# Patient Record
Sex: MALE | Race: WHITE | NOT HISPANIC OR LATINO | URBAN - METROPOLITAN AREA
[De-identification: names, ages, dates, MRNs, and addresses within clinical notes are randomized per-mention and may not be internally consistent; named-entity substitution may affect disease eponyms.]

---

## 2022-01-17 ENCOUNTER — HOSPITAL ENCOUNTER (EMERGENCY)
Facility: CLINIC | Age: 27
Discharge: ANOTHER HOSPITAL | End: 2022-01-18
Attending: EMERGENCY MEDICINE | Admitting: EMERGENCY MEDICINE
Payer: COMMERCIAL

## 2022-01-17 DIAGNOSIS — R73.9 HYPERGLYCEMIA: ICD-10-CM

## 2022-01-17 DIAGNOSIS — E10.10 DIABETIC KETOACIDOSIS WITHOUT COMA ASSOCIATED WITH TYPE 1 DIABETES MELLITUS (H): ICD-10-CM

## 2022-01-17 LAB — GLUCOSE BLDC GLUCOMTR-MCNC: 443 MG/DL (ref 70–99)

## 2022-01-17 PROCEDURE — 99285 EMERGENCY DEPT VISIT HI MDM: CPT

## 2022-01-17 ASSESSMENT — MIFFLIN-ST. JEOR: SCORE: 1729.22

## 2022-01-18 ENCOUNTER — HOSPITAL ENCOUNTER (INPATIENT)
Facility: CLINIC | Age: 27
LOS: 2 days | Discharge: HOME | End: 2022-01-20
Attending: HOSPITALIST | Admitting: INTERNAL MEDICINE
Payer: COMMERCIAL

## 2022-01-18 VITALS
HEIGHT: 72 IN | WEIGHT: 159 LBS | HEART RATE: 59 BPM | DIASTOLIC BLOOD PRESSURE: 72 MMHG | RESPIRATION RATE: 16 BRPM | SYSTOLIC BLOOD PRESSURE: 125 MMHG | BODY MASS INDEX: 21.54 KG/M2 | TEMPERATURE: 97.7 F | OXYGEN SATURATION: 100 %

## 2022-01-18 DIAGNOSIS — E10.10 DIABETIC KETOACIDOSIS WITHOUT COMA ASSOCIATED WITH TYPE 1 DIABETES MELLITUS (H): Primary | ICD-10-CM

## 2022-01-18 PROBLEM — E11.10 DKA (DIABETIC KETOACIDOSIS) (H): Status: ACTIVE | Noted: 2022-01-18

## 2022-01-18 LAB
ALBUMIN SERPL-MCNC: 3.8 G/DL (ref 3.4–5)
ALBUMIN SERPL-MCNC: 5 G/DL (ref 3.5–5)
ALBUMIN UR-MCNC: 30 MG/DL
ALP SERPL-CCNC: 74 U/L (ref 40–150)
ALP SERPL-CCNC: 89 U/L (ref 45–120)
ALT SERPL W P-5'-P-CCNC: 26 U/L (ref 0–45)
ALT SERPL W P-5'-P-CCNC: 27 U/L (ref 0–70)
ANION GAP SERPL CALCULATED.3IONS-SCNC: 10 MMOL/L (ref 3–14)
ANION GAP SERPL CALCULATED.3IONS-SCNC: 11 MMOL/L (ref 3–14)
ANION GAP SERPL CALCULATED.3IONS-SCNC: 22 MMOL/L (ref 5–18)
ANION GAP SERPL CALCULATED.3IONS-SCNC: 9 MMOL/L (ref 3–14)
APPEARANCE UR: CLEAR
AST SERPL W P-5'-P-CCNC: 10 U/L (ref 0–45)
AST SERPL W P-5'-P-CCNC: 17 U/L (ref 0–40)
ATRIAL RATE - MUSE: 68 BPM
BASE EXCESS BLDV CALC-SCNC: -12.9 MMOL/L
BASOPHILS # BLD AUTO: 0.1 10E3/UL (ref 0–0.2)
BASOPHILS NFR BLD AUTO: 1 %
BILIRUB SERPL-MCNC: 0.5 MG/DL (ref 0.2–1.3)
BILIRUB SERPL-MCNC: 0.6 MG/DL (ref 0–1)
BILIRUB UR QL STRIP: NEGATIVE
BUN SERPL-MCNC: 15 MG/DL (ref 7–30)
BUN SERPL-MCNC: 16 MG/DL (ref 7–30)
BUN SERPL-MCNC: 17 MG/DL (ref 8–22)
BUN SERPL-MCNC: 18 MG/DL (ref 7–30)
CALCIUM SERPL-MCNC: 10.7 MG/DL (ref 8.5–10.5)
CALCIUM SERPL-MCNC: 8.5 MG/DL (ref 8.5–10.1)
CALCIUM SERPL-MCNC: 8.5 MG/DL (ref 8.5–10.1)
CALCIUM SERPL-MCNC: 8.8 MG/DL (ref 8.5–10.1)
CHLORIDE BLD-SCNC: 100 MMOL/L (ref 98–107)
CHLORIDE BLD-SCNC: 109 MMOL/L (ref 94–109)
CHLORIDE BLD-SCNC: 109 MMOL/L (ref 94–109)
CHLORIDE BLD-SCNC: 112 MMOL/L (ref 94–109)
CO2 SERPL-SCNC: 11 MMOL/L (ref 22–31)
CO2 SERPL-SCNC: 17 MMOL/L (ref 20–32)
CO2 SERPL-SCNC: 19 MMOL/L (ref 20–32)
CO2 SERPL-SCNC: 21 MMOL/L (ref 20–32)
COLOR UR AUTO: COLORLESS
CREAT SERPL-MCNC: 0.7 MG/DL (ref 0.66–1.25)
CREAT SERPL-MCNC: 0.74 MG/DL (ref 0.66–1.25)
CREAT SERPL-MCNC: 0.75 MG/DL (ref 0.66–1.25)
CREAT SERPL-MCNC: 1.42 MG/DL (ref 0.7–1.3)
DIASTOLIC BLOOD PRESSURE - MUSE: NORMAL MMHG
EOSINOPHIL # BLD AUTO: 0.1 10E3/UL (ref 0–0.7)
EOSINOPHIL NFR BLD AUTO: 1 %
ERYTHROCYTE [DISTWIDTH] IN BLOOD BY AUTOMATED COUNT: 12.4 % (ref 10–15)
GFR SERPL CREATININE-BSD FRML MDRD: 69 ML/MIN/1.73M2
GFR SERPL CREATININE-BSD FRML MDRD: >90 ML/MIN/1.73M2
GLUCOSE BLD-MCNC: 164 MG/DL (ref 70–99)
GLUCOSE BLD-MCNC: 187 MG/DL (ref 70–99)
GLUCOSE BLD-MCNC: 218 MG/DL (ref 70–99)
GLUCOSE BLD-MCNC: 492 MG/DL (ref 70–125)
GLUCOSE BLDC GLUCOMTR-MCNC: 140 MG/DL (ref 70–99)
GLUCOSE BLDC GLUCOMTR-MCNC: 140 MG/DL (ref 70–99)
GLUCOSE BLDC GLUCOMTR-MCNC: 141 MG/DL (ref 70–99)
GLUCOSE BLDC GLUCOMTR-MCNC: 152 MG/DL (ref 70–99)
GLUCOSE BLDC GLUCOMTR-MCNC: 156 MG/DL (ref 70–99)
GLUCOSE BLDC GLUCOMTR-MCNC: 171 MG/DL (ref 70–99)
GLUCOSE BLDC GLUCOMTR-MCNC: 174 MG/DL (ref 70–99)
GLUCOSE BLDC GLUCOMTR-MCNC: 178 MG/DL (ref 70–99)
GLUCOSE BLDC GLUCOMTR-MCNC: 180 MG/DL (ref 70–99)
GLUCOSE BLDC GLUCOMTR-MCNC: 186 MG/DL (ref 70–99)
GLUCOSE BLDC GLUCOMTR-MCNC: 189 MG/DL (ref 70–99)
GLUCOSE BLDC GLUCOMTR-MCNC: 200 MG/DL (ref 70–99)
GLUCOSE BLDC GLUCOMTR-MCNC: 200 MG/DL (ref 70–99)
GLUCOSE BLDC GLUCOMTR-MCNC: 211 MG/DL (ref 70–99)
GLUCOSE BLDC GLUCOMTR-MCNC: 219 MG/DL (ref 70–99)
GLUCOSE BLDC GLUCOMTR-MCNC: 220 MG/DL (ref 70–99)
GLUCOSE BLDC GLUCOMTR-MCNC: 228 MG/DL (ref 70–99)
GLUCOSE BLDC GLUCOMTR-MCNC: 290 MG/DL (ref 70–99)
GLUCOSE BLDC GLUCOMTR-MCNC: 309 MG/DL (ref 70–99)
GLUCOSE UR STRIP-MCNC: >1000 MG/DL
HBA1C MFR BLD: 11.6 %
HCO3 BLDV-SCNC: 14 MMOL/L (ref 24–30)
HCT VFR BLD AUTO: 43.7 % (ref 40–53)
HGB BLD-MCNC: 15.5 G/DL (ref 13.3–17.7)
HGB UR QL STRIP: ABNORMAL
HOLD SPECIMEN: NORMAL
HYALINE CASTS: 3 /LPF
IMM GRANULOCYTES # BLD: 0.1 10E3/UL
IMM GRANULOCYTES NFR BLD: 1 %
INTERPRETATION ECG - MUSE: NORMAL
KETONES BLD-SCNC: 1.9 MMOL/L (ref 0–0.6)
KETONES BLD-SCNC: 2 MMOL/L (ref 0–0.6)
KETONES BLD-SCNC: 2.7 MMOL/L (ref 0–0.6)
KETONES BLD-SCNC: 3.7 MMOL/L (ref 0–0.6)
KETONES BLD-SCNC: 8.52 MMOL/L
KETONES UR STRIP-MCNC: 150 MG/DL
LEUKOCYTE ESTERASE UR QL STRIP: NEGATIVE
LYMPHOCYTES # BLD AUTO: 3.1 10E3/UL (ref 0.8–5.3)
LYMPHOCYTES NFR BLD AUTO: 39 %
MAGNESIUM SERPL-MCNC: 2.4 MG/DL (ref 1.6–2.3)
MCH RBC QN AUTO: 31.7 PG (ref 26.5–33)
MCHC RBC AUTO-ENTMCNC: 35.5 G/DL (ref 31.5–36.5)
MCV RBC AUTO: 89 FL (ref 78–100)
MONOCYTES # BLD AUTO: 0.6 10E3/UL (ref 0–1.3)
MONOCYTES NFR BLD AUTO: 8 %
MUCOUS THREADS #/AREA URNS LPF: PRESENT /LPF
NEUTROPHILS # BLD AUTO: 3.9 10E3/UL (ref 1.6–8.3)
NEUTROPHILS NFR BLD AUTO: 50 %
NITRATE UR QL: NEGATIVE
NRBC # BLD AUTO: 0 10E3/UL
NRBC BLD AUTO-RTO: 0 /100
OXYHGB MFR BLDV: 51.3 % (ref 70–75)
P AXIS - MUSE: 61 DEGREES
PCO2 BLDV: 42 MM HG (ref 35–50)
PH BLDV: 7.17 [PH] (ref 7.35–7.45)
PH UR STRIP: 5.5 [PH] (ref 5–7)
PHOSPHATE SERPL-MCNC: 1.8 MG/DL (ref 2.5–4.5)
PLATELET # BLD AUTO: 236 10E3/UL (ref 150–450)
PO2 BLDV: 28 MM HG (ref 25–47)
POTASSIUM BLD-SCNC: 3.3 MMOL/L (ref 3.4–5.3)
POTASSIUM BLD-SCNC: 3.4 MMOL/L (ref 3.4–5.3)
POTASSIUM BLD-SCNC: 3.4 MMOL/L (ref 3.4–5.3)
POTASSIUM BLD-SCNC: 3.6 MMOL/L (ref 3.4–5.3)
POTASSIUM BLD-SCNC: 3.6 MMOL/L (ref 3.4–5.3)
POTASSIUM BLD-SCNC: 3.8 MMOL/L (ref 3.5–5)
PR INTERVAL - MUSE: 150 MS
PROT SERPL-MCNC: 6.9 G/DL (ref 6.8–8.8)
PROT SERPL-MCNC: 8.3 G/DL (ref 6–8)
QRS DURATION - MUSE: 96 MS
QT - MUSE: 388 MS
QTC - MUSE: 412 MS
R AXIS - MUSE: 73 DEGREES
RBC # BLD AUTO: 4.89 10E6/UL (ref 4.4–5.9)
RBC URINE: 0 /HPF
SAO2 % BLDV: 52.3 % (ref 70–75)
SARS-COV-2 RNA RESP QL NAA+PROBE: NEGATIVE
SODIUM SERPL-SCNC: 133 MMOL/L (ref 136–145)
SODIUM SERPL-SCNC: 138 MMOL/L (ref 133–144)
SODIUM SERPL-SCNC: 139 MMOL/L (ref 133–144)
SODIUM SERPL-SCNC: 140 MMOL/L (ref 133–144)
SP GR UR STRIP: 1.04 (ref 1–1.03)
SYSTOLIC BLOOD PRESSURE - MUSE: NORMAL MMHG
T AXIS - MUSE: 42 DEGREES
TSH SERPL DL<=0.005 MIU/L-ACNC: 2.64 UIU/ML (ref 0.3–5)
UROBILINOGEN UR STRIP-MCNC: <2 MG/DL
VENTRICULAR RATE- MUSE: 68 BPM
WBC # BLD AUTO: 7.8 10E3/UL (ref 4–11)
WBC URINE: 0 /HPF

## 2022-01-18 PROCEDURE — 82010 KETONE BODYS QUAN: CPT | Performed by: HOSPITALIST

## 2022-01-18 PROCEDURE — 96365 THER/PROPH/DIAG IV INF INIT: CPT

## 2022-01-18 PROCEDURE — C9803 HOPD COVID-19 SPEC COLLECT: HCPCS

## 2022-01-18 PROCEDURE — 82374 ASSAY BLOOD CARBON DIOXIDE: CPT | Performed by: NURSE PRACTITIONER

## 2022-01-18 PROCEDURE — 83036 HEMOGLOBIN GLYCOSYLATED A1C: CPT | Performed by: EMERGENCY MEDICINE

## 2022-01-18 PROCEDURE — 96366 THER/PROPH/DIAG IV INF ADDON: CPT

## 2022-01-18 PROCEDURE — 258N000003 HC RX IP 258 OP 636: Performed by: EMERGENCY MEDICINE

## 2022-01-18 PROCEDURE — 250N000012 HC RX MED GY IP 250 OP 636 PS 637: Performed by: EMERGENCY MEDICINE

## 2022-01-18 PROCEDURE — 82310 ASSAY OF CALCIUM: CPT | Performed by: STUDENT IN AN ORGANIZED HEALTH CARE EDUCATION/TRAINING PROGRAM

## 2022-01-18 PROCEDURE — 84450 TRANSFERASE (AST) (SGOT): CPT | Performed by: HOSPITALIST

## 2022-01-18 PROCEDURE — 93005 ELECTROCARDIOGRAM TRACING: CPT

## 2022-01-18 PROCEDURE — 82784 ASSAY IGA/IGD/IGG/IGM EACH: CPT | Performed by: HOSPITALIST

## 2022-01-18 PROCEDURE — 93010 ELECTROCARDIOGRAM REPORT: CPT | Performed by: INTERNAL MEDICINE

## 2022-01-18 PROCEDURE — 99222 1ST HOSP IP/OBS MODERATE 55: CPT | Performed by: NURSE PRACTITIONER

## 2022-01-18 PROCEDURE — 82010 KETONE BODYS QUAN: CPT | Performed by: EMERGENCY MEDICINE

## 2022-01-18 PROCEDURE — 36415 COLL VENOUS BLD VENIPUNCTURE: CPT | Performed by: HOSPITALIST

## 2022-01-18 PROCEDURE — 82962 GLUCOSE BLOOD TEST: CPT

## 2022-01-18 PROCEDURE — 80053 COMPREHEN METABOLIC PANEL: CPT | Performed by: EMERGENCY MEDICINE

## 2022-01-18 PROCEDURE — 87040 BLOOD CULTURE FOR BACTERIA: CPT | Performed by: HOSPITALIST

## 2022-01-18 PROCEDURE — 36415 COLL VENOUS BLD VENIPUNCTURE: CPT | Performed by: EMERGENCY MEDICINE

## 2022-01-18 PROCEDURE — 99223 1ST HOSP IP/OBS HIGH 75: CPT | Performed by: HOSPITALIST

## 2022-01-18 PROCEDURE — 84132 ASSAY OF SERUM POTASSIUM: CPT | Performed by: INTERNAL MEDICINE

## 2022-01-18 PROCEDURE — 99223 1ST HOSP IP/OBS HIGH 75: CPT | Mod: AI | Performed by: INTERNAL MEDICINE

## 2022-01-18 PROCEDURE — 258N000001 HC RX 258: Performed by: EMERGENCY MEDICINE

## 2022-01-18 PROCEDURE — 250N000009 HC RX 250: Performed by: HOSPITALIST

## 2022-01-18 PROCEDURE — 96361 HYDRATE IV INFUSION ADD-ON: CPT

## 2022-01-18 PROCEDURE — 250N000013 HC RX MED GY IP 250 OP 250 PS 637: Performed by: INTERNAL MEDICINE

## 2022-01-18 PROCEDURE — 120N000002 HC R&B MED SURG/OB UMMC

## 2022-01-18 PROCEDURE — 86341 ISLET CELL ANTIBODY: CPT | Performed by: NURSE PRACTITIONER

## 2022-01-18 PROCEDURE — 999N000128 HC STATISTIC PERIPHERAL IV START W/O US GUIDANCE

## 2022-01-18 PROCEDURE — 250N000012 HC RX MED GY IP 250 OP 636 PS 637: Performed by: NURSE PRACTITIONER

## 2022-01-18 PROCEDURE — 85025 COMPLETE CBC W/AUTO DIFF WBC: CPT | Performed by: EMERGENCY MEDICINE

## 2022-01-18 PROCEDURE — 250N000011 HC RX IP 250 OP 636: Performed by: HOSPITALIST

## 2022-01-18 PROCEDURE — 36415 COLL VENOUS BLD VENIPUNCTURE: CPT | Performed by: STUDENT IN AN ORGANIZED HEALTH CARE EDUCATION/TRAINING PROGRAM

## 2022-01-18 PROCEDURE — 84100 ASSAY OF PHOSPHORUS: CPT | Performed by: HOSPITALIST

## 2022-01-18 PROCEDURE — 96372 THER/PROPH/DIAG INJ SC/IM: CPT | Performed by: HOSPITALIST

## 2022-01-18 PROCEDURE — 250N000009 HC RX 250: Performed by: NURSE PRACTITIONER

## 2022-01-18 PROCEDURE — 83516 IMMUNOASSAY NONANTIBODY: CPT | Performed by: HOSPITALIST

## 2022-01-18 PROCEDURE — 258N000003 HC RX IP 258 OP 636: Performed by: HOSPITALIST

## 2022-01-18 PROCEDURE — 250N000013 HC RX MED GY IP 250 OP 250 PS 637: Performed by: STUDENT IN AN ORGANIZED HEALTH CARE EDUCATION/TRAINING PROGRAM

## 2022-01-18 PROCEDURE — 83735 ASSAY OF MAGNESIUM: CPT | Performed by: HOSPITALIST

## 2022-01-18 PROCEDURE — 84443 ASSAY THYROID STIM HORMONE: CPT | Performed by: HOSPITALIST

## 2022-01-18 PROCEDURE — 87635 SARS-COV-2 COVID-19 AMP PRB: CPT | Performed by: EMERGENCY MEDICINE

## 2022-01-18 PROCEDURE — 82805 BLOOD GASES W/O2 SATURATION: CPT | Performed by: EMERGENCY MEDICINE

## 2022-01-18 PROCEDURE — 84155 ASSAY OF PROTEIN SERUM: CPT | Performed by: HOSPITALIST

## 2022-01-18 PROCEDURE — 96368 THER/DIAG CONCURRENT INF: CPT

## 2022-01-18 PROCEDURE — 36592 COLLECT BLOOD FROM PICC: CPT | Performed by: HOSPITALIST

## 2022-01-18 PROCEDURE — 84681 ASSAY OF C-PEPTIDE: CPT | Performed by: NURSE PRACTITIONER

## 2022-01-18 PROCEDURE — 81001 URINALYSIS AUTO W/SCOPE: CPT | Performed by: EMERGENCY MEDICINE

## 2022-01-18 PROCEDURE — 36415 COLL VENOUS BLD VENIPUNCTURE: CPT | Performed by: NURSE PRACTITIONER

## 2022-01-18 RX ORDER — MULTIPLE VITAMINS W/ MINERALS TAB 9MG-400MCG
1 TAB ORAL DAILY
COMMUNITY

## 2022-01-18 RX ORDER — DEXTROSE MONOHYDRATE 25 G/50ML
25-50 INJECTION, SOLUTION INTRAVENOUS
Status: DISCONTINUED | OUTPATIENT
Start: 2022-01-18 | End: 2022-01-20 | Stop reason: HOSPADM

## 2022-01-18 RX ORDER — NICOTINE POLACRILEX 4 MG
15-30 LOZENGE BUCCAL
Status: DISCONTINUED | OUTPATIENT
Start: 2022-01-18 | End: 2022-01-20 | Stop reason: HOSPADM

## 2022-01-18 RX ORDER — POTASSIUM CHLORIDE 750 MG/1
40 TABLET, EXTENDED RELEASE ORAL ONCE
Status: COMPLETED | OUTPATIENT
Start: 2022-01-18 | End: 2022-01-18

## 2022-01-18 RX ORDER — DEXTROSE MONOHYDRATE, SODIUM CHLORIDE, AND POTASSIUM CHLORIDE 50; 2.24; 4.5 G/1000ML; G/1000ML; G/1000ML
INJECTION, SOLUTION INTRAVENOUS CONTINUOUS
Status: DISCONTINUED | OUTPATIENT
Start: 2022-01-18 | End: 2022-01-18

## 2022-01-18 RX ORDER — NICOTINE POLACRILEX 4 MG
15-30 LOZENGE BUCCAL
Status: DISCONTINUED | OUTPATIENT
Start: 2022-01-18 | End: 2022-01-19

## 2022-01-18 RX ORDER — DEXTROSE MONOHYDRATE 25 G/50ML
25-50 INJECTION, SOLUTION INTRAVENOUS
Status: DISCONTINUED | OUTPATIENT
Start: 2022-01-18 | End: 2022-01-18 | Stop reason: HOSPADM

## 2022-01-18 RX ORDER — LIDOCAINE 40 MG/G
CREAM TOPICAL
Status: CANCELLED | OUTPATIENT
Start: 2022-01-18

## 2022-01-18 RX ORDER — DEXTROSE MONOHYDRATE 25 G/50ML
25-50 INJECTION, SOLUTION INTRAVENOUS
Status: DISCONTINUED | OUTPATIENT
Start: 2022-01-18 | End: 2022-01-19

## 2022-01-18 RX ADMIN — POTASSIUM CHLORIDE 40 MEQ: 750 TABLET, EXTENDED RELEASE ORAL at 15:54

## 2022-01-18 RX ADMIN — INSULIN ASPART 3 UNITS: 100 INJECTION, SOLUTION INTRAVENOUS; SUBCUTANEOUS at 14:14

## 2022-01-18 RX ADMIN — HUMAN INSULIN 2.5 UNITS/HR: 100 INJECTION, SOLUTION SUBCUTANEOUS at 15:54

## 2022-01-18 RX ADMIN — HUMAN INSULIN 3 UNITS/HR: 100 INJECTION, SOLUTION SUBCUTANEOUS at 08:07

## 2022-01-18 RX ADMIN — POTASSIUM CHLORIDE 40 MEQ: 750 TABLET, EXTENDED RELEASE ORAL at 10:17

## 2022-01-18 RX ADMIN — DEXTROSE AND SODIUM CHLORIDE 1000 ML: 5; 450 INJECTION, SOLUTION INTRAVENOUS at 04:10

## 2022-01-18 RX ADMIN — POTASSIUM & SODIUM PHOSPHATES POWDER PACK 280-160-250 MG 2 PACKET: 280-160-250 PACK at 10:17

## 2022-01-18 RX ADMIN — INSULIN ASPART 1 UNITS: 100 INJECTION, SOLUTION INTRAVENOUS; SUBCUTANEOUS at 19:30

## 2022-01-18 RX ADMIN — POTASSIUM & SODIUM PHOSPHATES POWDER PACK 280-160-250 MG 2 PACKET: 280-160-250 PACK at 20:11

## 2022-01-18 RX ADMIN — ENOXAPARIN SODIUM 40 MG: 40 INJECTION SUBCUTANEOUS at 10:10

## 2022-01-18 RX ADMIN — INSULIN GLARGINE 18 UNITS: 100 INJECTION, SOLUTION SUBCUTANEOUS at 19:59

## 2022-01-18 RX ADMIN — POTASSIUM & SODIUM PHOSPHATES POWDER PACK 280-160-250 MG 2 PACKET: 280-160-250 PACK at 14:14

## 2022-01-18 RX ADMIN — POTASSIUM CHLORIDE, DEXTROSE MONOHYDRATE AND SODIUM CHLORIDE: 224; 5; 450 INJECTION, SOLUTION INTRAVENOUS at 11:42

## 2022-01-18 RX ADMIN — SODIUM CHLORIDE 2000 ML: 9 INJECTION, SOLUTION INTRAVENOUS at 00:03

## 2022-01-18 RX ADMIN — SODIUM CHLORIDE 5.5 UNITS/HR: 9 INJECTION, SOLUTION INTRAVENOUS at 01:04

## 2022-01-18 ASSESSMENT — ACTIVITIES OF DAILY LIVING (ADL)
ADLS_ACUITY_SCORE: 4
ADLS_ACUITY_SCORE: 4
DRESSING/BATHING_DIFFICULTY: NO
WALKING_OR_CLIMBING_STAIRS_DIFFICULTY: NO
WEAR_GLASSES_OR_BLIND: YES
ADLS_ACUITY_SCORE: 4
DIFFICULTY_COMMUNICATING: NO
ADLS_ACUITY_SCORE: 4
TOILETING_ISSUES: NO
HEARING_DIFFICULTY_OR_DEAF: NO
CONCENTRATING,_REMEMBERING_OR_MAKING_DECISIONS_DIFFICULTY: NO
ADLS_ACUITY_SCORE: 4
ADLS_ACUITY_SCORE: 4
FALL_HISTORY_WITHIN_LAST_SIX_MONTHS: NO
DEPENDENT_IADLS:: INDEPENDENT
VISION_MANAGEMENT: GLASSES
ADLS_ACUITY_SCORE: 4
DIFFICULTY_EATING/SWALLOWING: NO
ADLS_ACUITY_SCORE: 4
ADLS_ACUITY_SCORE: 4
PATIENT_/_FAMILY_COMMUNICATION_STYLE: SPOKEN LANGUAGE (ENGLISH OR BILINGUAL)
ADLS_ACUITY_SCORE: 4
ADLS_ACUITY_SCORE: 4
DOING_ERRANDS_INDEPENDENTLY_DIFFICULTY: NO

## 2022-01-18 ASSESSMENT — MIFFLIN-ST. JEOR: SCORE: 1730

## 2022-01-18 NOTE — CONSULTS
Discharge Pharmacy Test Claim    Patient has commercial prescription coverage through [facility]. Patient has $0 copays for formulary medications. Requested test claim copays are listed below.    Test Claim Copay   contour next glucometer  0.00   novolin N flexpens 0.00   novolog flexpens 0.00   semglee pens 0.00   basaglar not covered   lantus not covered       Ivana Kessler  [facility] Pharmacy Liaison  Ph: [phone] Pager: [phone]

## 2022-01-18 NOTE — CONSULTS
UPDATE: 1/18/22 1840: Anion gap closed X 2, ketones clearing.  Patient eating well, primary team wanting to transition off IV insulin.    -Lantus 18 units  -discontinue IV insulin gtt 2 hours after Lantus dose  -start medium resistance sliding scale insulin before meals, bedtime  -BG checks once IV insulin stopped-before meals, bedtime, 0200  -increase CHo coverage to 1:14g CHO from 1:15g CHO with meals, snacks    NEW INPATIENT DIABETES MANAGEMENT CONSULT  Louie Alicia  Age: 26 year old  MRN # [id number]   YOB: 1995    Chief Complaint: DKA  Reason for Consult: new DM 1  Consulting Provider: Diane Germain MD    History of Present Illness: Louie Alicia saw his primary care doctor because he was not feeling well for about a month.  Has had increased urination, thirst, and weight loss of about 20 pounds denies family history of diabetes, unsure family history of thyroid or celiac disease.  Denies changes in bowel habits.  Denies chest pain, shortness of breath  All other systems reviewed and are negative. He works at a bank.  In the ED, patient found to be in DKA was started on DKA order set.    Louie states he was feeling pretty poorly for past 2 weeks, but started noticing symptoms of weight loss, polyuria, polydipsia about one month ago.  He states he has had a normal appetite.      On presentation to ED ketones 8.52, anion gap 22, creatinine 1.42.  Bicarb 11.  He was started on DKA insulin gtt protocol.  This AM labs showed ketones improving to 3.7.  Anion gap improved to 11, bicarb 17.  TSH 2.64.  COVID 19 negative. BG slowly improving, see trend:      Wt. Based insulin dosing: Basal insulin 18 units, 1:14g CHO with meals, medium sliding scale insulin.  Will trend BG on insulin once stabilizing.  Once anion gap closed X 2 and eating, can switch to non DKA protocol insulin gtt.  No prior glucose labs in system or Care Everywhere.        Other Active Medical Problems: none  known  Diabetes Mellitus Type: likely type 1  Duration: new diagnosis  Diabetic Complications: none  Prior to Admission Diabetes Regimen: N/A  BG monitor: N/A  Frequency of checks: N/A  Diet: eats usually one big meal per day and several smaller meals/snacks, some ETOH on weekends  Usual BG control PTA:  A1C pending  History of DKA: current episode  Able to Detect Hypoglycemia:  N/A  Usual Diabetes Care Provider: N/A  Primary Care Provider: Calvin Greenberg---[facility]  Factors Impacting IP Glucose Control: new diagnosis  Current Diet: 60g CHO diet    10 point ROS completed with pertinent positives and negatives noted in the HPI  Past medical, family and social histories are reviewed and updated.    Social History    Tobacco: denies    Alcohol: some ETOH on weekends, mostly beer    Marital Status: single    Place of Residence: [place]    Occupation:     Family History   denies family history of DM    Physical Exam   /69 (BP Location: Left arm)   Pulse 74   Temp 98.8  F (37.1  C) (Oral)   Resp 12   Ht 1.829 m (6')   Wt 72.2 kg (159 lb 2.8 oz)   SpO2 97%   BMI 21.59 kg/m    General: pleasant, in no distress.   HEENT: normocephalic, atraumatic. Oral mucous membranes moist.   Lungs: unlabored respiration, no cough  ABD: rounded, nondistended  Skin: warm and dry, no obvious lesions  MSK:  moves all extremities  Lymp:  no LE edema   Mental status:  alert, oriented to self, place, time  Psych:   calm and appropriate interaction     Most Recent Laboratory Tests:  Recent Labs   Lab 01/18/22  0003   HGB 15.5     No results for input(s): A1C in the last 168 hours.  Recent Labs   Lab 01/18/22  0720   CR 0.70     Recent Labs   Lab 01/18/22  0858 01/18/22  0755 01/18/22  0720 01/18/22  0645 01/18/22  0503 01/18/22  0405   * 171* 187* 180* 220* 200*       Assessment:   1) likely new diagnosis DM 1 presenting with DKA    Plan:    -PALOMO antibodies this AM   -consider checking c-peptide when  hyperglycemia resolved   -continue DKA IV insulin gtt protocol this AM--->switch to non DKA insulin infusion this afternoon   -start Novolog 1:15g CHO coverage with all meals, snacks   -BG monitoring Q1-2 hours   -stop D5 IVF now (1500)   -check BMP, ketones this afternoon (ordered for you) and evening   -hypoglycemia protocol   -recommend 60g CHO diet with carb counting protocol   -nutrition and CDE consult, will see tomorrow   -anticipate transitioning off IV insulin this evening, endocrine will arrange and place orders   -on discharge, will recommend outpatient follow up with [facility]ocr[facility]ology service or endocrinology through [facility] (patient to decide)    Discussed plan of care with patient, bedside RN, and primary team.  Thank you for this consult; Inpatient Diabetes will continue to follow.     To contact Endocrine Diabetes service:   From 8AM-4PM: page inpatient diabetes provider that is following the patient  For questions or updates from 4PM-8AM: page the diabetes job code for on call fellow: [phone]      80 minutes spent on the date of the encounter doing chart review, history and exam, documentation and further activities per the note      Over 50% of my time on the unit was spent counseling the patient and/or coordinating care regarding acute hyperglycemia management.  See note for details.    VIDAL Olmedo, CNP  Inpatient Diabetes Management Service  Pager [phone]

## 2022-01-18 NOTE — H&P
Hospital Medicine Service History and Physical  [facility]: [facility]    Louie Alicia is a 26 year old male who  has no past medical history on file. ACL tear  Chief Complaint: hyperglycemia    Assessment and Plan  DKA  New onset type 1 diabetes  Screening TSH and celiac labs  Transition off gtt when gap closes, follow order set  Check UA  Non-septic appearing  Will need endo referral at discharge    DVTP: ambulate  Code Status: No Order Full  Disposition: Inpatient   Estimated body mass index is 21.56 kg/m  as calculated from the following:    Height as of this encounter: 1.829 m (6').    Weight as of this encounter: 72.1 kg (159 lb).    History of Present Illness  Louie Alicia saw his primary care doctor because he was not feeling well for about a month.  Has had increased urination, thirst, and weight loss of about 20 pounds denies family history of diabetes, unsure family history of thyroid or celiac disease.  Denies changes in bowel habits.  Denies chest pain, shortness of breath  All other systems reviewed and are negative. He works at a bank.  In the ED, patient found to be in DKA was started on DKA order set.    Appears nad  Anicteric conjunctiva, PERRL, glasses  moist mucous membranes, intactdentition  Trachea midline, no jvd  cta, Respiratory effort normal on RA  rrr, no murmur  Abdomen soft, nondistended, nontender  no edema, clubbing absent  Skin normal temperature, dry  normal affect, alert  Vital signs reviewed by me    Wt Readings from Last 4 Encounters:   01/17/22 72.1 kg (159 lb)   01/15/09 68.9 kg (152 lb) (78 %, Z= 0.76)*     * Growth percentiles are based on CDC (Boys, 2-20 Years) data.        reports that he has never smoked. He does not have any smokeless tobacco history on file.  family history includes C.A.D. in his paternal grandfather; Prostate Cancer in his paternal grandfather.   has no past surgical history on file. ACL repair  Allergies   Allergen Reactions      Amoxicillin      Cefprozil        Alvaro Siddiqui MD, MPH  [facility] Medicine  [facility]   Phone: #[phone]

## 2022-01-18 NOTE — ED PROVIDER NOTES
"EMERGENCY DEPARTMENT SIGN OUT NOTE        ED COURSE AND MEDICAL DECISION MAKING  Patient was signed out to me by Dr Beata Wu at 12:15 AM  1:31 AM I spoke to Dr. Siddiqui, hospitalist, regarding patient.  2:50 AM I spoke to Dr. Hills from the [facility].    In brief, Louie Alicia is a 26 year old male with no contributory PMHx who presents to the ED today via private car with hyperglycemia. For the past month, patient has feeling fatigued, losing weight. He has been eating more protein bars as he has been losing weight. He also has some cramping in the legs. Patient also feels thirsty all the time, with cotton mouth. Patient has ongoing increased urinary frequency. He visited his PCP today, where he had lab work done showing that his \"blood sugar is dangerously high.\" Here in triage, patient's blood sugar is measured to be 443.    At time of sign out, disposition was pending clinical reassessment and labs.    Patient found to have DKA.  pH is 7.1.  Bicarb is low.  Started on insulin drip.  Was able to find a bed at the [facility].  Discussed with Dr. Hills.  Patient will be transferred there.    FINAL IMPRESSION    1. Hyperglycemia        ED MEDS  Medications   0.9% sodium chloride BOLUS (2,000 mLs Intravenous New Bag 1/18/22 0003)       LAB  Labs Ordered and Resulted from Time of ED Arrival to Time of ED Departure   GLUCOSE BY METER - Abnormal       Result Value    GLUCOSE BY METER POCT 443 (*)    BLOOD GAS VENOUS - Abnormal    pH Venous 7.17 (*)     pCO2 Venous 42      pO2 Venous 28      Bicarbonate Venous 14 (*)     Base Excess/Deficit (+/-) -12.9      Oxyhemoglobin Venous 51.3 (*)     O2 Sat, Venous 52.3 (*)    CBC WITH PLATELETS AND DIFFERENTIAL    WBC Count 7.8      RBC Count 4.89      Hemoglobin 15.5      Hematocrit 43.7      MCV 89      MCH 31.7      MCHC 35.5      RDW 12.4      Platelet Count 236      % Neutrophils 50      % Lymphocytes 39      % Monocytes 8      % Eosinophils 1   "    % Basophils 1      % Immature Granulocytes 1      NRBCs per 100 WBC 0      Absolute Neutrophils 3.9      Absolute Lymphocytes 3.1      Absolute Monocytes 0.6      Absolute Eosinophils 0.1      Absolute Basophils 0.1      Absolute Immature Granulocytes 0.1      Absolute NRBCs 0.0     GLUCOSE MONITOR NURSING POCT   COMPREHENSIVE METABOLIC PANEL   HEMOGLOBIN A1C   COVID-19 VIRUS (CORONAVIRUS) BY PCR   KETONE BETA-HYDROXYBUTYRATE QUANTITATIVE, RAPID   ROUTINE UA WITH MICROSCOPIC REFLEX TO CULTURE       EKG      RADIOLOGY    No orders to display       DISCHARGE MEDS  New Prescriptions    No medications on file         Tacos Lucas M.D.  Emergency Medicine  [facility] EMERGENCY ROOM  [address] [address] [phone]  Dept: [phone]     Tacos Lucas MD  01/18/22 0330

## 2022-01-18 NOTE — LETTER
Transition Communication Hand-off for Care Transitions to Next Level of Care Provider    Name: Louie Alicia  : 1995  MRN #: [id number]  Primary Care Provider: Calvin Greenberg     Primary Clinic: [facility] [address] [facility] [address]     Reason for Hospitalization:  DKA (diabetic ketoacidosis) (H) [E11.10]  Admit Date/Time: 2022  6:18 AM  Discharge Date: 2022  Payor Source: Payor: BCBS / Plan: [place]BS [place] / Product Type: Indemnity /          Reason for Communication Hand-off Referral: Other New diagnosis of diabetes    Discharge Plan:  Patient discharged w/new insulin and diabetic supplies. Patient will need endocrine follow up within 1 week, he is debating doing this at [facility].      Concern for non-adherence with plan of care:   No  Discharge Needs Assessment:  Needs      Most Recent Value   Equipment Currently Used at Home none   # of Referrals Placed by CTS External Care Coordination        Follow-up specialty is recommended: Yes    Follow-up plan:  No future appointments.    Yadira Ramires, RNCC, BSN    [facility]    [facility] [address]  [address] [place] [address]    [email or web address]    Office: [phone] Pager: [phone]  To contact the weekend RNCC, page [phone].         AVS/Discharge Summary is the source of truth; this is a helpful guide for improved communication of patient story

## 2022-01-18 NOTE — ED PROVIDER NOTES
EMERGENCY DEPARTMENT ENCOUNTER      NAME: Louie Alicia  AGE: 26 year old male  YOB: 1995  MRN: [id number]  EVALUATION DATE & TIME: No admission date for patient encounter.    PCP: Calvin Greenberg    ED PROVIDER: Beata Wu M.D.      Chief Complaint   Patient presents with     Hyperglycemia         FINAL IMPRESSION:  1. Hyperglycemia          ED COURSE & MEDICAL DECISION MAKING:    ED Course as of 01/18/22 0002   Mon Jan 17, 2022   2342 Pt with one month weight loss, increased thirst, dry mouth, and fatigue with muscle cramps and never obese, which is overall more suggestive of IDDM1 likely new onset iwth glucose over 700 at PMD office today and 443 here in ED. A1c and CBC/chemistry pending with VBG and beta hydroxybutyrate, UA and asymptomatic COVID19 PCR (has not yet had COVID19 per him), 2L NS bolus begun and pt to ED shortly, seen in private space in ED triage room to expedite workup in the ER   Tue Jan 18, 2022   0001 Pt signed out to PM ED MD pending clinical reassessment, labs with likely new onset DM, unknown Dm1 or Dm2 but despite age 26 would not expect Dm2 given history and habitus     11:25 PM I met with the patient, obtained history, performed an initial exam, and discussed options and plan for diagnostics and treatment here in the ED.    Pertinent Labs & Imaging studies reviewed. (See chart for details)    N95 worn      At the conclusion of the encounter I discussed the results of all of the tests and the disposition. The questions were answered. The patient or family acknowledged understanding and was agreeable with the care plan.     MEDICATIONS GIVEN IN THE EMERGENCY:  Medications   0.9% sodium chloride BOLUS (has no administration in time range)       NEW PRESCRIPTIONS STARTED AT TODAY'S ER VISIT  New Prescriptions    No medications on file          =================================================================    HPI      Louie Alicia is a 26 year old male with no  "contributory PMHx who presents to the ED today via private car with hyperglycemia.     For the past month, patient has feeling fatigued, losing weight. He has been eating more protein bars as he has been losing weight. He also has some cramping in the legs. Patient also feels thirsty all the time, with cotton mouth. Patient has ongoing increased urinary frequency. He visited his PCP today, where he had lab work done showing that his \"blood sugar is dangerously high.\" Here in triage, patient's blood sugar is measured to be 443. Patient has limited information regarding familial history of diabetes mellitus. He denies having COVID-19 recently. No new fevers, cough, chest pain, shortness of breath. He did have a couple episodes of vomiting throughout the month, but none today. No nausea or diarrhea. He did not take any OTC medications.     REVIEW OF SYSTEMS   All other systems reviewed and are negative except as noted above in HPI.    PAST MEDICAL HISTORY:  History reviewed. No pertinent past medical history.    PAST SURGICAL HISTORY:  History reviewed. No pertinent surgical history.    CURRENT MEDICATIONS:    HYDROcodone-acetaminophen (NORCO) 5-325 MG per tablet  ondansetron (ZOFRAN ODT) 4 MG disintegrating tablet        ALLERGIES:  Allergies   Allergen Reactions     Amoxicillin      Cefprozil        FAMILY HISTORY:  Family History   Problem Relation Age of Onset     Prostate Cancer Paternal Grandfather      C.A.D. Paternal Grandfather      Cancer - colorectal No family hx of        SOCIAL HISTORY:   Social History     Socioeconomic History     Marital status: Single     Spouse name: Not on file     Number of children: Not on file     Years of education: Not on file     Highest education level: Not on file   Occupational History     Not on file   Tobacco Use     Smoking status: Never Smoker     Smokeless tobacco: Not on file   Substance and Sexual Activity     Alcohol use: Not on file     Drug use: Not on file     " Sexual activity: Not on file   Other Topics Concern     Not on file   Social History Narrative     Not on file     Social Determinants of Health     Financial Resource Strain: Not on file   Food Insecurity: Not on file   Transportation Needs: Not on file   Physical Activity: Not on file   Stress: Not on file   Social Connections: Not on file   Intimate Partner Violence: Not on file   Housing Stability: Not on file       VITALS:  Patient Vitals for the past 24 hrs:   BP Temp Temp src Pulse Resp SpO2 Height Weight   01/17/22 2322 (!) 146/92 97.7  F (36.5  C) Oral 71 18 100 % 1.829 m (6') 72.1 kg (159 lb)       PHYSICAL EXAM    GENERAL: Awake, alert.  In no acute distress.   HEENT: Normocephalic, atraumatic.  Pupils equal, round and reactive.  Conjunctiva normal.  EOMI.  NECK: No stridor or apparent deformity.  PULMONARY: Symmetrical breath sounds without distress.  Lungs clear to auscultation bilaterally without wheezes, rhonchi or rales.  CARDIO: Regular rate and rhythm.  No significant murmur, rub or gallop.  Radial pulses strong and symmetrical.  ABDOMINAL: Abdomen soft, non-distended and non-tender to palpation.  No CVAT, no palpable hepatosplenomegaly.  EXTREMITIES: No lower extremity swelling or edema.    NEURO: Alert and oriented to person, place and time.  Cranial nerves grossly intact.  No focal motor deficit.  PSYCH: Normal mood and affect  SKIN: No rashes      LAB:  All pertinent labs reviewed and interpreted.  Results for orders placed or performed during the hospital encounter of 01/17/22   Glucose by meter   Result Value Ref Range    GLUCOSE BY METER POCT 443 (H) 70 - 99 mg/dL           I, Sara Behmanesh , am serving as a scribe to document services personally performed by Dr. Beata Wu based on my observation and the provider's statements to me. I, Beata Wu MD attest that Sara Behmanesh is acting in a scribe capacity, has observed my performance of the services and has documented them in  accordance with my direction.     Beata Wu MD  01/18/22 0002     yes...

## 2022-01-18 NOTE — ED TRIAGE NOTES
Pt got a call from [facility] after blood draw today that blood sugar was at 729.  Pt is not a known diabetic.  Instructed to be seen.

## 2022-01-18 NOTE — PHARMACY-ADMISSION MEDICATION HISTORY
Admission Medication History Completed by Pharmacy    See [facility] Admission Navigator for allergy information, preferred outpatient pharmacy, prior to admission medications and immunization status.     Medication History Sources:     Patient    Changes made to PTA medication list (reason):    Added: daily multivitamin    Deleted: None    Changed: None    Additional Information:    Updated allergy to amoxicillin and cefprozil.  Patient does not know what his reaction was (occurred as a child)    Does not have a specific pharmacy that he prefers to have medications sent to    Prior to Admission medications    Medication Sig Last Dose Taking? Auth Provider   multivitamin w/minerals (THERA-VIT-M) tablet Take 1 tablet by mouth daily Past Week at Unknown time Yes Unknown, Entered By History       Date completed: 01/18/22    Medication history completed by: Leonie Miller, PhilipD, BCPS

## 2022-01-18 NOTE — PLAN OF CARE
Admitted/transferred from: [facility]  Reason for admission/transfer: DKA  2 RN skin assessment: completed by Angelique CARO  Result of skin assessment and interventions/actions: Intact/ no wounds  Height, weight, drug calc weight: yes  Patient belongings (see Flowsheet)    ?

## 2022-01-18 NOTE — CONSULTS
Care Management Initial Consult    General Information  Assessment completed with: Louie Diaz  Type of CM/SW Visit: Initial Assessment    Primary Care Provider verified and updated as needed: Yes   Readmission within the last 30 days: no previous admission in last 30 days      Reason for Consult: discharge planning  Advance Care Planning:            Communication Assessment  Patient's communication style: spoken language (English or Bilingual)    Hearing Difficulty or Deaf: no   Wear Glasses or Blind: yes    Cognitive  Cognitive/Neuro/Behavioral: WDL                      Living Environment:   People in home: other (see comments) (2 roommates)     Current living Arrangements: house      Able to return to prior arrangements:         Family/Social Support:  Care provided by:    Provides care for:    Marital Status: Single             Description of Support System:           Current Resources:   Patient receiving home care services: No     Community Resources: None  Equipment currently used at home: none  Supplies currently used at home: None    Employment/Financial:  Employment Status: employed full-time        Financial Concerns: No concerns identified           Lifestyle & Psychosocial Needs:  Social Determinants of Health     Tobacco Use: Unknown     Smoking Tobacco Use: Never Smoker     Smokeless Tobacco Use: Unknown   Alcohol Use: Not on file   Financial Resource Strain: Not on file   Food Insecurity: Not on file   Transportation Needs: Not on file   Physical Activity: Not on file   Stress: Not on file   Social Connections: Not on file   Intimate Partner Violence: Not on file   Depression: Not on file   Housing Stability: Not on file       Functional Status:  Prior to admission patient needed assistance:   Dependent ADLs:: Independent  Dependent IADLs:: Independent       Mental Health Status:  Mental Health Status: No Current Concerns       Chemical Dependency Status:  Chemical Dependency Status: No Current  Concerns             Values/Beliefs:  Spiritual, Cultural Beliefs, Hindu Practices, Values that affect care:                 Additional Information:  Patient was called for initial case management assessment as patient has potential discharge planning needs. Patient lives in a house with two other roommates, is independent at baseline. Patient has newly diagnosed DM1, PCP was verified. Will need external handoff at discharge. Patient does not endorse any care management needs at discharge at this point. RNCC and SW will continue to follow.    SANTA Wilson, BA, RN, RNCC  Pager: [phone]  Phone: [phone]  Weekend/Holiday Pager: [phone]

## 2022-01-19 LAB
ANION GAP SERPL CALCULATED.3IONS-SCNC: 11 MMOL/L (ref 3–14)
BUN SERPL-MCNC: 17 MG/DL (ref 7–30)
C PEPTIDE SERPL-MCNC: 0.6 NG/ML (ref 0.9–6.9)
CALCIUM SERPL-MCNC: 9 MG/DL (ref 8.5–10.1)
CHLORIDE BLD-SCNC: 104 MMOL/L (ref 94–109)
CO2 SERPL-SCNC: 22 MMOL/L (ref 20–32)
CREAT SERPL-MCNC: 0.77 MG/DL (ref 0.66–1.25)
GFR SERPL CREATININE-BSD FRML MDRD: >90 ML/MIN/1.73M2
GLIADIN IGA SER-ACNC: <1 U/ML
GLIADIN IGG SER-ACNC: 0.8 U/ML
GLUCOSE BLD-MCNC: 320 MG/DL (ref 70–99)
GLUCOSE BLDC GLUCOMTR-MCNC: 189 MG/DL (ref 70–99)
GLUCOSE BLDC GLUCOMTR-MCNC: 211 MG/DL (ref 70–99)
GLUCOSE BLDC GLUCOMTR-MCNC: 232 MG/DL (ref 70–99)
GLUCOSE BLDC GLUCOMTR-MCNC: 241 MG/DL (ref 70–99)
GLUCOSE BLDC GLUCOMTR-MCNC: 242 MG/DL (ref 70–99)
GLUCOSE BLDC GLUCOMTR-MCNC: 252 MG/DL (ref 70–99)
IGA SERPL-MCNC: <2 MG/DL (ref 84–499)
KETONES BLD-SCNC: 6.3 MMOL/L (ref 0–0.6)
PHOSPHATE SERPL-MCNC: 3.8 MG/DL (ref 2.5–4.5)
POTASSIUM BLD-SCNC: 3.7 MMOL/L (ref 3.4–5.3)
POTASSIUM BLD-SCNC: 3.7 MMOL/L (ref 3.4–5.3)
SODIUM SERPL-SCNC: 137 MMOL/L (ref 133–144)
TTG IGA SER-ACNC: <1 U/ML
TTG IGG SER-ACNC: 1.8 U/ML

## 2022-01-19 PROCEDURE — 36415 COLL VENOUS BLD VENIPUNCTURE: CPT | Performed by: INTERNAL MEDICINE

## 2022-01-19 PROCEDURE — 99207 PR CDG-MDM COMPONENT: MEETS LOW - DOWN CODED: CPT | Performed by: INTERNAL MEDICINE

## 2022-01-19 PROCEDURE — 120N000002 HC R&B MED SURG/OB UMMC

## 2022-01-19 PROCEDURE — 250N000011 HC RX IP 250 OP 636: Performed by: HOSPITALIST

## 2022-01-19 PROCEDURE — 36415 COLL VENOUS BLD VENIPUNCTURE: CPT | Performed by: NURSE PRACTITIONER

## 2022-01-19 PROCEDURE — 99233 SBSQ HOSP IP/OBS HIGH 50: CPT | Performed by: NURSE PRACTITIONER

## 2022-01-19 PROCEDURE — 99231 SBSQ HOSP IP/OBS SF/LOW 25: CPT | Performed by: INTERNAL MEDICINE

## 2022-01-19 PROCEDURE — 84100 ASSAY OF PHOSPHORUS: CPT | Performed by: INTERNAL MEDICINE

## 2022-01-19 PROCEDURE — 82310 ASSAY OF CALCIUM: CPT | Performed by: NURSE PRACTITIONER

## 2022-01-19 PROCEDURE — 82010 KETONE BODYS QUAN: CPT | Performed by: NURSE PRACTITIONER

## 2022-01-19 PROCEDURE — 258N000003 HC RX IP 258 OP 636: Performed by: STUDENT IN AN ORGANIZED HEALTH CARE EDUCATION/TRAINING PROGRAM

## 2022-01-19 PROCEDURE — 84132 ASSAY OF SERUM POTASSIUM: CPT | Performed by: INTERNAL MEDICINE

## 2022-01-19 RX ADMIN — ENOXAPARIN SODIUM 40 MG: 40 INJECTION SUBCUTANEOUS at 08:46

## 2022-01-19 RX ADMIN — INSULIN ASPART 2 UNITS: 100 INJECTION, SOLUTION INTRAVENOUS; SUBCUTANEOUS at 08:49

## 2022-01-19 RX ADMIN — INSULIN ASPART 5 UNITS: 100 INJECTION, SOLUTION INTRAVENOUS; SUBCUTANEOUS at 18:07

## 2022-01-19 RX ADMIN — INSULIN ASPART 4 UNITS: 100 INJECTION, SOLUTION INTRAVENOUS; SUBCUTANEOUS at 13:32

## 2022-01-19 RX ADMIN — SODIUM CHLORIDE, POTASSIUM CHLORIDE, SODIUM LACTATE AND CALCIUM CHLORIDE 1000 ML: 600; 310; 30; 20 INJECTION, SOLUTION INTRAVENOUS at 10:58

## 2022-01-19 ASSESSMENT — ACTIVITIES OF DAILY LIVING (ADL)
ADLS_ACUITY_SCORE: 4

## 2022-01-19 NOTE — PROGRESS NOTES
[facility]    Progress Note - Medicine Service, DANNY TEAM 3       Date of Admission:  1/18/2022    Assessment & Plan        Louie Alicia is a 26 year old male admitted on 1/18/2022. He has no significant past medical history and is admitted for first episode of diabetic ketoacidosis.     #Diabetic ketoacidosis, resolved  #Diabetes mellitus, new onset, likely type 1  Tito presented with 2 weeks of gradually increasing polyuria, polydipsia, and fatigue accompanied by otherwise unexplained weight loss. His blood sugar in clinic was 729 so he was referred to the [facility] ED.  There he was found to be in diabetic ketoacidosis with blood glucose of 443, pH 7.17, bicarb 11, A1c 11.6, positive urine and serum ketones.  No prior personal or family history of diabetes, no clear inciting event.   Gap closed as of this morning, blood glucose reasonably well-controlled on subcutaneous insulin. Ketones elevated to 6 today, gave another liter of LR and will repeat check in the morning. Endo planning to adjust insulin for tighter control today. Working with diabetes educator today, likely ready to discharge tomorrow.  -Endocrine consult  -Current insulin regimen as below  -Diabetes education per endocrine team  -BMP and serum ketones in AM  -Encouraging p.o. intake and working on carb counting  -PALOMO antibody pending  -Potassium replacement protocol  -Phosphorus replacement protocol     Tentative recommendations for discharge (pasted into AVS):  -Glargine (Semglee) pen 22 units daily-give at 4pm (can move to morning or bedtime-whatever works best for patient schedule)  -Novolog 1 units for every 11 grams of carbohydrates with meals, snacks  -Novolog sliding scale insulin   Before meals---  For Pre-Meal  - 164 give 1 unit.   For Pre-Meal  - 189 give 2 units.   For Pre-Meal  - 214 give 3 units.   For Pre-Meal  - 239 give 4 units.   For Pre-Meal  -  264 give 5 units.   For Pre-Meal  - 289 give 6 units.   For Pre-Meal  - 314 give 7 units.   For Pre-Meal  - 339 give 8 units.   For Pre-Meal  - 364 give 9 units.   For Pre-Meal BG greater than or equal to 365 give 10 units     Before bedtime---  For  - 224 give 1 units.   For  - 249 give 2 units.   For  - 274 give 3 units.   For  - 299 give 4 units.   For  - 324 give 5 units.   For  - 349 give 6 units.   For BG greater than or equal to 350 give 7 units.     #DENISE, resolved  Presented with DENISE, creatinine to 1.42.  Unclear baseline but should be lower than this in an otherwise healthy young man.  Creatinine down to 0.7 by 1/18 AM.  -BMP in AM        Diet: Moderate Consistent Carb (60 g CHO per Meal) Diet    DVT Prophylaxis: Enoxaparin (Lovenox) SQ  Gil Catheter: Not present  Fluids: None  Central Lines: None  Cardiac Monitoring: None  Code Status: Full Code      Disposition Plan   Expected Discharge: 01/20/2022     Anticipated discharge location: home    Delays:          The patient's care was discussed with the Attending Physician, Dr. Nagel, Patient and endocrine Consultant.    Bran Orlando MD  Medicine Service, [facility] TEAM [address] [place] [address]  Securely message with the Vocera Web Console (learn more here)  Text page via [facility] Paging/Directory   Please see signed in provider for up to date coverage information      Clinically Significant Risk Factors Present on Admission              # Diabetes, type II: last A1C 11.6 % (Ref range: <=5.6 %)       ______________________________________________________________________    Interval History   No acute events overnight.  Transitioned well to subcutaneous insulin.  Gave his own dose this morning with assistance from the nurse.  Planning to work with diabetes education today.  Symptoms have fully resolved, no abdominal pain.  No other new symptoms today.    Data  reviewed today: I reviewed all medications, new labs and imaging results over the last 24 hours.    Physical Exam   Vital Signs: Temp: 97.9  F (36.6  C) Temp src: Oral BP: 129/89 Pulse: 66   Resp: 14 SpO2: 98 % O2 Device: None (Room air)    Weight: 159 lbs 2.75 oz  Constitutional: awake, alert, cooperative, no apparent distress  Eyes: EOMI, sclerae clear, conjunctivae normal  Respiratory: No increased work of breathing, good air movement, clear to auscultation bilaterally, no crackles or wheezing  Cardiovascular: RRR, no murmurs or gallops, warm and well perfused, no peripheral edema  GI: Normal bowel sounds, soft, non-distended, non-tender, no masses palpated  Skin: no rashes and no jaundice  Musculoskeletal: No susan deformity. There is no redness, warmth, or swelling of the joints.  Full range of motion noted.   Neurologic: Awake, alert, oriented to name, place and time.  Cranial nerves II-XII are grossly intact.  Neuropsychiatric: Calm, appropriate affect    Data   Recent Labs   Lab 01/19/22  1210 01/19/22  0919 01/19/22  0842 01/19/22  0551 01/18/22  2059 01/18/22  2048 01/18/22  1452 01/18/22  1341 01/18/22  0755 01/18/22  0720 01/18/22  0158 01/18/22  0003   WBC  --   --   --   --   --   --   --   --   --   --   --  7.8   HGB  --   --   --   --   --   --   --   --   --   --   --  15.5   MCV  --   --   --   --   --   --   --   --   --   --   --  89   PLT  --   --   --   --   --   --   --   --   --   --   --  236   NA  --  137  --   --   --  139  --  138  --  140  --  133*   POTASSIUM  --  3.7  --  3.7  --  3.6  3.6  --  3.4  3.4  --  3.3*  --  3.8   CHLORIDE  --  104  --   --   --  109  --  109  --  112*  --  100   CO2  --  22  --   --   --  21  --  19*  --  17*  --  11*   BUN  --  17  --   --   --  18  --  16  --  15  --  17   CR  --  0.77  --   --   --  0.74  --  0.75  --  0.70  --  1.42*   ANIONGAP  --  11  --   --   --  9  --  10  --  11  --  22*   ASHVIN  --  9.0  --   --   --  8.8  --  8.5  --  8.5  --   10.7*   * 320* 252*  --    < > 218*   < > 164*   < > 187*   < > 492*   ALBUMIN  --   --   --   --   --   --   --   --   --  3.8  --  5.0   PROTTOTAL  --   --   --   --   --   --   --   --   --  6.9  --  8.3*   BILITOTAL  --   --   --   --   --   --   --   --   --  0.5  --  0.6   ALKPHOS  --   --   --   --   --   --   --   --   --  74  --  89   ALT  --   --   --   --   --   --   --   --   --  27  --  26   AST  --   --   --   --   --   --   --   --   --  10  --  17    < > = values in this interval not displayed.       Internal Medicine Staff Addendum  Date of Service: 1/19/2022    I have seen and examined this patient, reviewed the data and discussed the plan of care. I agree with the above documentation including plan and ddx unless otherwise stated:     #    Yoni Nagel MD  Internal Medicine [facility]  Attending pager: [phone]

## 2022-01-19 NOTE — PROGRESS NOTES
Neuro: A&Ox4.   Cardiac: SR. VSS.   Respiratory: Sating >90% on RA.  GI/: Adequate urine output. No reported BM today.  Diet/appetite: Tolerating carb controlled diet. Eating well. Carbohydrates covered per MAR orders. , 242, and 241, covered by sliding scale insulin per MAR.  Activity:  Up independent in room. Steady gait.  Pain: Denies pain.   Skin: No deficits noted.  LDA's: L and R PIV, saline locked.    Event: Critical lab value: ketone 6.2, 1000 mL LR bolus given per MD order.

## 2022-01-19 NOTE — PROGRESS NOTES
IP Diabetes Management  Daily Note           Assessment and Plan:   HPI: Louie Alicia saw his primary care doctor because he was not feeling well for about a month.  Has had increased urination, thirst, and weight loss of about 20 pounds.  In the ED, patient found to be in DKA was started on DKA order set with IV insulin gtt.      DKA now resolved. Serum ketones 2.0. Transitioned off IV insulin last evening.  May discharge today.       Assessment:   1) presumable Type 1Diabetes Mellitus (PALOMO Ab pending), new diagnosis and uncontrolled (A1c 11.6), presenting with DKA    C peptide low. PALOMO Ab pending.      Plan:    -recommend 1L LR bolus given ongoing elevated ketones   -repeat BMP serum ketones today--ketones 6.3   -PALOMO ab pending   -increase Lantus to 22 units daily-give at 1600   -increase Novolog to 1:11g from 1:14g CHO coverage with meals and snacks/supplements   -increase to Novolog high intensity sliding scale TID AC and HS today   -BG monitoring TID AC, HS, 0200   -hypoglycemia protocol   -carb counting protocol   -CDE and nutrition consult   -recommend rechecking serum ketones    Outpatient follow up: Recommend close follow up with [facility] in 5-7 days, F/U order placed 1/19/22  Plan discussed with patient, bedside RN, and primary team.          Tentative recommendations for discharge (pasted into AVS):  -Glargine (Semglee) pen 22 units daily-give at 4pm (can move to morning or bedtime-whatever works best for patient schedule)  -Novolog 1 units for every 11 grams of carbohydrates with meals, snacks  -Novolog sliding scale insulin   Before meals---  For Pre-Meal  - 164 give 1 unit.   For Pre-Meal  - 189 give 2 units.   For Pre-Meal  - 214 give 3 units.   For Pre-Meal  - 239 give 4 units.   For Pre-Meal  - 264 give 5 units.   For Pre-Meal  - 289 give 6 units.   For Pre-Meal  - 314 give 7 units.   For Pre-Meal  - 339 give 8 units.   For Pre-Meal   - 364 give 9 units.   For Pre-Meal BG greater than or equal to 365 give 10 units    Before bedtime---  For  - 224 give 1 units.   For  - 249 give 2 units.   For  - 274 give 3 units.   For  - 299 give 4 units.   For  - 324 give 5 units.   For  - 349 give 6 units.   For BG greater than or equal to 350 give 7 units.     -check blood sugar before meals, bedtime, in the middle of the night (if awake) and record-bring to the clinic follow up  -follow [facility] placed for [facility] in 5-7 days, patient can also go to [facility] if he would like, but encourage close follow up in 5-7 days    Patient will need these supplies/medications ordered:  [employer] Contour Next glucose meter-Meter and starter kit provided   [employer] Contour Next test strips   Rosalio Microlet Lancets   Assuming need for glargine:   Semglee (glargine) insulin pens   Novolog flexpens   B-D 4 mm mario alberto pen needles   Alcohol Wipes   Sharps container     Interval History and Assessment: interval glucose trend reviewed:         Transitioned off IV insulin yesterday evening with Lantus 18 units.  -250s today.  Will increase Lantus to 22 units from 18 units, increase CHO coverage to 1:11g CHO and high resistance sliding scale insulin with meals, bedtime. Serum ketones 2.0, anion gap closed and bicarb in normal range.  GFR>90.  Paged primary team to give 1L NS bolus this AM.  Repeat BMP, serum ketones ordered for this AM as well and ketones 6.3.  Unsure if component of starvation ketosis.  DKA is resolved.  He is eating well.  Admits to not drinking a lot of water in the room because it is hard to get to the bathroom.      CDE to see today and patient to potentially discharge to home.    Current nutritional intake and type: Orders Placed This Encounter      Moderate Consistent Carb (60 g CHO per Meal) Diet      Planned Procedures/surgeries: none  Steroid planning: none  D5W-containing solutions/medications: none    PTA  Diabetes Regimen: N/A    Discharge Planning: possibly today           Diabetes History:   Type of Diabetes: Type 1 Diabetes Mellitus  Lab Results   Component Value Date    A1C 11.6 01/18/2022              Review of Systems:     The Review of Systems is negative other than noted in the Interval History.           Medications:     Current Facility-Administered Medications   Medication     glucose gel 15-30 g    Or     dextrose 50 % injection 25-50 mL    Or     glucagon injection 1 mg     glucose gel 15-30 g    Or     dextrose 50 % injection 25-50 mL    Or     glucagon injection 1 mg     enoxaparin ANTICOAGULANT (LOVENOX) injection 40 mg     insulin aspart (NovoLOG) injection (RAPID ACTING)     insulin aspart (NovoLOG) injection (RAPID ACTING)     insulin aspart (NovoLOG) injection (RAPID ACTING)     insulin aspart (NovoLOG) injection (RAPID ACTING)     insulin glargine (LANTUS PEN) injection 22 Units     Med Instruction - Transition from IV Insulin Infusion to Sub-Q Insulin            Physical Exam:    /75 (BP Location: Left arm)   Pulse 63   Temp 98.5  F (36.9  C) (Oral)   Resp 14   Ht 1.829 m (6')   Wt 72.2 kg (159 lb 2.8 oz)   SpO2 99%   BMI 21.59 kg/m    General: pleasant, in no distress.   HEENT: normocephalic, atraumatic. Oral mucous membranes moist.   Lungs: unlabored respiration, no cough  ABD: rounded, nondistended  Skin: warm and dry, no obvious lesions  MSK:  moves all extremities  Lymp:  no LE edema   Mental status:  alert, oriented to self, place, time  Psych:  affect, calm and appropriate interaction             Data:     Recent Labs   Lab 01/19/22  1210 01/19/22  0919 01/19/22  0842 01/19/22  0413 01/18/22  2358 01/18/22  2204   * 320* 252* 232* 189* 189*     Lab Results   Component Value Date    WBC 7.8 01/18/2022    HGB 15.5 01/18/2022    HCT 43.7 01/18/2022    MCV 89 01/18/2022     01/18/2022     Lab Results   Component Value Date     01/18/2022     01/18/2022      01/18/2022    POTASSIUM 3.7 01/19/2022    POTASSIUM 3.6 01/18/2022    POTASSIUM 3.6 01/18/2022    CHLORIDE 109 01/18/2022    CHLORIDE 109 01/18/2022    CHLORIDE 112 (H) 01/18/2022    CO2 21 01/18/2022    CO2 19 (L) 01/18/2022    CO2 17 (L) 01/18/2022     (H) 01/19/2022     (H) 01/19/2022     (H) 01/18/2022     Lab Results   Component Value Date    BUN 18 01/18/2022    BUN 16 01/18/2022    BUN 15 01/18/2022     Lab Results   Component Value Date    TSH 2.64 01/18/2022     Lab Results   Component Value Date    AST 10 01/18/2022    AST 17 01/18/2022    ALT 27 01/18/2022    ALT 26 01/18/2022    ALKPHOS 74 01/18/2022    ALKPHOS 89 01/18/2022           35 minutes spent on the date of the encounter doing chart review, history and exam, documentation and further activities per the note      Over 50% of my time on the unit was spent counseling the patient and/or coordinating care regarding acute hyperglycemia management.  See note for details.    To contact Endocrine Diabetes service:   From 8AM-4PM: page inpatient diabetes provider that is following the patient  For questions or updates from 4PM-8AM: page the diabetes job code for on call fellow: [phone]    VIDAL Olmedo, CNP  Inpatient Diabetes Management Service  Pager [phone]

## 2022-01-19 NOTE — PROGRESS NOTES
"CLINICAL NUTRITION SERVICES - ASSESSMENT NOTE     Nutrition Prescription    RECOMMENDATIONS FOR MDs/PROVIDERS TO ORDER:  Patient to ask any further nutrition-related questions before discharge. Recommend referral for outpatient CDE/RD appointment for ongoing education needs post discharge.     Malnutrition Status:    Patient does not meet two of the established criteria necessary for diagnosing malnutrition    Recommendations already ordered by Registered Dietitian (RD):  Provided nutrition education on consistent carbohydrate diet     Future/Additional Recommendations:  Monitor need for further nutrition education.      REASON FOR ASSESSMENT  Louie Alicia is a/an 26 year old male assessed by the dietitian for Admission Nutrition Risk Screen for positive- weight loss    NUTRITION HISTORY  Louie reports that his appetite has been good. He presented with gradually increasing polyuria, polydipsia, and fatigue accompanied by otherwise unexplained weight loss. Patient admitted for diabetic ketoacidosis. Pt reports that he hasn't had any education on nutrition for diabetes before.     CURRENT NUTRITION ORDERS  Diet: Moderate Consistent Carbohydrate  Intake/Tolerance: Good appetite currently.     LABS  Labs reviewed  A1C 11.6 (H)  BG -: 140-492  Ketones Urine 150    MEDICATIONS  Medications reviewed  Novlo unit per 11 grams of carbs, with meals, snacks, and supplements  Novolog- correction scale   Lantus Pen- 22 units daily    ANTHROPOMETRICS  Height: 182.9 cm (6' 0\")  Most Recent Weight: 72.2 kg (159 lb 2.8 oz)    IBW: 80.9 kg (89%)   BMI: Normal BMI  Weight History: Pt reports that his weight is usually around 170-175 lbs (77.2-79.5 kg), and he last weighed this around ~2 months ago. Around  his weight was 164 lbs (74.5 kg).   Wt Readings from Last 10 Encounters:   22 72.2 kg (159 lb 2.8 oz)   22 72.1 kg (159 lb)   01/15/09 68.9 kg (152 lb) (78 %, Z= 0.76)*     * Growth percentiles " are based on CDC (Boys, 2-20 Years) data.     72.1 kg (159 lb) 01/17/2022     Dosing Weight: 72 kg (admit weight)     ASSESSED NUTRITION NEEDS  Estimated Energy Needs: 9999-4187 kcals/day (25 - 30 kcals/kg)  Justification: Maintenance  Estimated Protein Needs: 70-85 grams protein/day (1 - 1.2 grams of pro/kg)  Justification: Preservation of LBM  Estimated Fluid Needs: 1 mL/kcal  Justification: Maintenance    PHYSICAL FINDINGS  See malnutrition section below.    MALNUTRITION  % Intake: Decreased intake does not meet criteria  % Weight Loss: Weight loss does not meet criteria- limited weight history available  Subcutaneous Fat Loss: None observed  Muscle Loss: None observed  Fluid Accumulation/Edema: None noted  Malnutrition Diagnosis: Patient does not meet two of the established criteria necessary for diagnosing malnutrition    NUTRITION DIAGNOSIS  Food- and nutrition-related knowledge deficit r/t no previous knowledge of a consistent carb diet with diabetes as evidenced by no previous nutrition education     INTERVENTIONS  Implementation  Nutrition Education: Provided nutrition education on food groups, what foods contain carbohydrates, and label reading.   Provided handouts titled: Carbohydrate Counting     Goals  Patient will verbalize understanding of consistent CHO diet, meal planning and label reading.   Reduce HgbA1c.  Accuracy with carbohydrate counting.      Monitoring/Evaluation  Progress toward goals will be monitored and evaluated per protocol.    Shaina Britt, RD, LD  6D RD pager [phone]

## 2022-01-19 NOTE — CONSULTS
DIABETES CARE  Consulted by Provider for Diabetes Education: new DM    26 year old male with new diabetes diagnosis likely type 1. Patient was admitted for DKA.   Related Co-morbidities include:   No past medical history on file.      PCP: none  Social: from home, lives in apartment    Nutrition & Diabetes History:   Recent hx of polydipsia, polyuria, fatigue, weight loss in past 2 weeks.     Meds for BG Management PTA:  -None    Current Inpatient Meds for BG Management:  -Novolog 1u per 11g cho  -Novolog high insulin resistant scale with meals + HS  -Lantus 22u    Labs:  Hemoglobin A1C: 11.6 (1/18/22)        Hgb: 15.5       SCr: 0.77 GFR: >90  BGs:   Results for LELAND JOHNSON (MRN [id number]) as of 1/19/2022 15:56   Ref. Range 1/18/2022 20:59 1/18/2022 22:04 1/18/2022 23:58 1/19/2022 04:13 1/19/2022 05:51 1/19/2022 08:42 1/19/2022 09:19 1/19/2022 12:10   GLUCOSE BY METER POCT Latest Ref Range: 70 - 99 mg/dL 186 (H) 189 (H) 189 (H) 232 (H)  252 (H)  242 (H)       Diet Order:  60g mod cho diet Intake: 100%  Weight: 72.2kg    BMI: Body mass index is 21.59 kg/m .      DM EDUCATION/COUNSELING:  Current Education and/or visit with Patient and/or caregiver(s):  Educated on new diabetes diagnosis including pathophysiology, type 1 vs 2 and differences in treatment. Discussed diet and medications important for optimizing BG.     Pt received diet ed from unit RD. We reviewed this, discussed that if he could cho count this would allow more liberalization of diet. Pt feels this should be easy for him. We discussed how to determine cho in food and different apps he can use. Pt is hoping to eat more regular sized meals and minimize snacking or eat low cho snacks to avoid additional injections. We also talked about ETOH use.    Provided pt with Contour Next meter. Instructed on how to use this and pt able to demonstrate use. Reviewed importance of checking blood glucose levels and keeping log and/or bringing meter to all f/u  "visits. Also informed pt of CGMs, pt interested in this, will have pharm liaison run insurance.     Discussed current blood sugars/A1C and target/goal numbers.  Reviewed hypoglycemia/hyperglycemia symptoms.  Educated on how to treat low Bg and that this is a side effect with insulin. Reviewed when to call/who to call.     Instructed on insulin pen use. Pt reports he has self-administered insulin x 2 sinc being here. Medications(basal/nutritional/correction) were explained, including how they each acted on blood sugar, their timing and differences in dosing. Also discussed site rotation, proper storage and safe needle disposal.     We reviewed importance of close f/u OP. He needs to establish care with PCP and endocrinology.     (See also \"Diabetic Ed Flowsheet\"  Or Education tab-diabetes for any education topic details.)    ASSESSMENT:  Pt with new diabetes diagnosis, likely type 1. Did well with education, asked a lot of good questions. Will need close f/u OP.     DISCHARGE NEEDS:   [employer] Contour Next test strips  Rosalio Microlet Lancets  Semglee (glargine) insulin pens  Novolog flexpens  B-D 4 mm mario alberto pen needles  Alcohol Wipes  Sharps container      Thank you,   Franci Cloud RD, LD, [facility] [place] [facility] [place]  Diabetes Educator  Pager: [phone]            "

## 2022-01-19 NOTE — DISCHARGE INSTRUCTIONS
IP Diabetes Management Team Discharge Instructions    Glucose Control Regimen:       -Glargine (Semglee)insulin: 26 units daily at 4pm (can move to morning or bedtime, just move forward or back a few hours each day to achieve this)    -Novolog insulin: 1 unit for every 10 grams of carbohydrates eaten, give with meals and snacks (give within 30 min of eating)    -novolog insulin: high intensity sliding scale (see below)  Before meals---  For Pre-Meal  - 164 give 1 unit.   For Pre-Meal  - 189 give 2 units.   For Pre-Meal  - 214 give 3 units.   For Pre-Meal  - 239 give 4 units.   For Pre-Meal  - 264 give 5 units.   For Pre-Meal  - 289 give 6 units.   For Pre-Meal  - 314 give 7 units.   For Pre-Meal  - 339 give 8 units.   For Pre-Meal  - 364 give 9 units.   For Pre-Meal BG greater than or equal to 365 give 10 units    Before bedtime---  For  - 224 give 1 units.   For  - 249 give 2 units.   For  - 274 give 3 units.   For  - 299 give 4 units.   For  - 324 give 5 units.   For  - 349 give 6 units.   For BG greater than or equal to 350 give 7 units.     Blood Glucose Checks: three times daily before meals, and at bedtime.    Endocrinology Outpatient follow up: An appointment request was sent to the [facility] coordinator to schedule your outpatient diabetes appointment in 5-7 days from discharge. Please call the clinic at [phone] if you do not have an appointment scheduled on discharge, or if you have non-urgent questions regarding your blood sugars or insulin.     If you have urgent questions or concerns regarding your blood sugars or insulin, you may contact [phone] (the main hospital ). Ask to speak with the endocrinologist on call.    Your target A1c value is less than 7%. Your most recent A1c is 11.6.     Thank you for letting the Diabetes Management Team be involved in your care!

## 2022-01-19 NOTE — PLAN OF CARE
Major Shift Events:  A&Ox4, neuro intact. Insulin gtts stopped yesterday evening around 2200, two hours after dose of lantus given per order. -230. BG check before meal and @ bedtime with carbs coverage given for meals and snacks.VSS on room air, up independently in room. Voiding, no BM this shift. No acute overnight event.   Plan:Possible diabetes education today. Will continue with plan of care.   For vital signs and complete assessments, please see documentation flowsheets.

## 2022-01-19 NOTE — PLAN OF CARE
Major Shift Events: Insulin gtt infusing per protocol. BG checks q 1 hr. Pt w/ good appetite, carb coverage given for meals and snacks. UAL in room. K+ and phos replacement initiated, rechecks ordered.  Plan: Possible insulin gtt discontinuation this PM. Diabetes educator consult placed, cont to provide education and support.   For vital signs and complete assessments, please see documentation flowsheets.

## 2022-01-20 VITALS
RESPIRATION RATE: 12 BRPM | BODY MASS INDEX: 21.56 KG/M2 | HEIGHT: 72 IN | OXYGEN SATURATION: 99 % | TEMPERATURE: 97.9 F | DIASTOLIC BLOOD PRESSURE: 79 MMHG | HEART RATE: 64 BPM | WEIGHT: 159.17 LBS | SYSTOLIC BLOOD PRESSURE: 118 MMHG

## 2022-01-20 LAB
ANION GAP SERPL CALCULATED.3IONS-SCNC: 7 MMOL/L (ref 3–14)
BUN SERPL-MCNC: 17 MG/DL (ref 7–30)
CALCIUM SERPL-MCNC: 9.1 MG/DL (ref 8.5–10.1)
CHLORIDE BLD-SCNC: 104 MMOL/L (ref 94–109)
CO2 SERPL-SCNC: 28 MMOL/L (ref 20–32)
CREAT SERPL-MCNC: 0.71 MG/DL (ref 0.66–1.25)
GAD65 AB SER IA-ACNC: >250 IU/ML
GFR SERPL CREATININE-BSD FRML MDRD: >90 ML/MIN/1.73M2
GLUCOSE BLD-MCNC: 214 MG/DL (ref 70–99)
GLUCOSE BLDC GLUCOMTR-MCNC: 182 MG/DL (ref 70–99)
GLUCOSE BLDC GLUCOMTR-MCNC: 222 MG/DL (ref 70–99)
GLUCOSE BLDC GLUCOMTR-MCNC: 248 MG/DL (ref 70–99)
HOLD SPECIMEN: NORMAL
KETONES BLD-SCNC: 1.6 MMOL/L (ref 0–0.6)
PHOSPHATE SERPL-MCNC: 4.7 MG/DL (ref 2.5–4.5)
POTASSIUM BLD-SCNC: 3.4 MMOL/L (ref 3.4–5.3)
SODIUM SERPL-SCNC: 139 MMOL/L (ref 133–144)

## 2022-01-20 PROCEDURE — 99239 HOSP IP/OBS DSCHRG MGMT >30: CPT | Performed by: INTERNAL MEDICINE

## 2022-01-20 PROCEDURE — 84100 ASSAY OF PHOSPHORUS: CPT | Performed by: INTERNAL MEDICINE

## 2022-01-20 PROCEDURE — 82310 ASSAY OF CALCIUM: CPT | Performed by: STUDENT IN AN ORGANIZED HEALTH CARE EDUCATION/TRAINING PROGRAM

## 2022-01-20 PROCEDURE — 36415 COLL VENOUS BLD VENIPUNCTURE: CPT | Performed by: STUDENT IN AN ORGANIZED HEALTH CARE EDUCATION/TRAINING PROGRAM

## 2022-01-20 PROCEDURE — 250N000011 HC RX IP 250 OP 636: Performed by: HOSPITALIST

## 2022-01-20 PROCEDURE — 99233 SBSQ HOSP IP/OBS HIGH 50: CPT | Performed by: NURSE PRACTITIONER

## 2022-01-20 PROCEDURE — 82010 KETONE BODYS QUAN: CPT | Performed by: STUDENT IN AN ORGANIZED HEALTH CARE EDUCATION/TRAINING PROGRAM

## 2022-01-20 RX ORDER — NICOTINE POLACRILEX 4 MG
LOZENGE BUCCAL
Qty: 112.5 G | Refills: 0 | Status: SHIPPED | OUTPATIENT
Start: 2022-01-20

## 2022-01-20 RX ORDER — BLOOD PRESSURE TEST KIT
KIT MISCELLANEOUS
Qty: 100 EACH | Refills: 0 | Status: SHIPPED | OUTPATIENT
Start: 2022-01-20 | End: 2022-02-08

## 2022-01-20 RX ORDER — INSULIN GLARGINE-YFGN 100 [IU]/ML
26 INJECTION, SOLUTION SUBCUTANEOUS AT BEDTIME
Qty: 15 ML | Refills: 1 | Status: SHIPPED | OUTPATIENT
Start: 2022-01-20 | End: 2022-03-29

## 2022-01-20 RX ORDER — CONTAINER,EMPTY
EACH MISCELLANEOUS
Qty: 1 EACH | Refills: 0 | Status: SHIPPED | OUTPATIENT
Start: 2022-01-20

## 2022-01-20 RX ADMIN — ENOXAPARIN SODIUM 40 MG: 40 INJECTION SUBCUTANEOUS at 08:51

## 2022-01-20 RX ADMIN — INSULIN ASPART 4 UNITS: 100 INJECTION, SOLUTION INTRAVENOUS; SUBCUTANEOUS at 08:52

## 2022-01-20 ASSESSMENT — ACTIVITIES OF DAILY LIVING (ADL)
ADLS_ACUITY_SCORE: 4

## 2022-01-20 NOTE — CONSULTS
Discharge Pharmacy Test Claim    Continuous glucose monitors are not covered by patient's insurance, ARNOLDO Kessler  [facility] Liaison  Ph: [phone] Pager: [phone]

## 2022-01-20 NOTE — PLAN OF CARE
Major Shift Events:  A&Ox4, neuro intact. BG check in the 180's. BG check before meal and @ bedtime with carbs coverage given for meals and snacks. VSS on room air, up independently in room. Voiding but not saving, BMx 2. No acute overnight event.   Plan: Possible discharge today. Will continue with plan of care.   For vital signs and complete assessments, please see documentation flowsheets.

## 2022-01-20 NOTE — PLAN OF CARE
Patient was educated on discharge instructions and the importance of blood sugar management/medication administration. Personally watched pt administer insulin and check his blood sugar. Stable for discharge to home and instructed pt to follow up with endocrine upon discharge.

## 2022-01-20 NOTE — PROGRESS NOTES
Diabetes Educator Note    Met with pt again to review and answer questions. Also discussed that CGM is not covered with insurance however encouraged him to call his insurance to acquire on this, may be able to get if pt meets certain criteria.  Also gave number for [employer] for discounted Freestyle Paulina as another possibility.     Spoke with pt's mom and dad over the phone regarding new diagnosis.     Franci Cloud RD, SURY, [facility]  Pager: [phone]

## 2022-01-20 NOTE — PROGRESS NOTES
IP Diabetes Management  Daily Note           Assessment and Plan:   HPI: Louie Alicia saw his primary care doctor because he was not feeling well for about a month.  Has had increased urination, thirst, and weight loss of about 20 pounds.  In the ED, patient found to be in DKA was started on DKA order set with IV insulin gtt.      DKA now resolved. Serum ketones 1.6.         Assessment:   1) presumable Type 1Diabetes Mellitus (PALOMO Ab pending), new diagnosis and uncontrolled (A1c 11.6), presenting with DKA    C peptide low. PALOMO Ab pending.      Plan:    -PALOMO ab pending   -increase Lantus to 26 units daily from 22 units-give at 1600   -increase Novolog to 1:10g from 1:11g CHO coverage with meals and snacks/supplements   -increase to Novolog high intensity sliding scale TID AC and HS    -BG monitoring TID AC, HS, 0200   -hypoglycemia protocol   -carb counting protocol   -CDE and nutrition consult--complete   -recommend rechecking serum ketones    Outpatient follow up: Recommend close follow up with [facility]ology in 5-7 days, F/U order placed 1/19/22  Plan discussed with patient, bedside RN, and primary team.          Tentative recommendations for discharge (pasted into AVS):  -Glargine (Semglee) pen 26 units daily-give at 4pm (can move to morning or bedtime-whatever works best for patient schedule)  -Novolog 1 units for every 10 grams of carbohydrates with meals, snacks  -Novolog sliding scale insulin   Before meals---  For Pre-Meal  - 164 give 1 unit.   For Pre-Meal  - 189 give 2 units.   For Pre-Meal  - 214 give 3 units.   For Pre-Meal  - 239 give 4 units.   For Pre-Meal  - 264 give 5 units.   For Pre-Meal  - 289 give 6 units.   For Pre-Meal  - 314 give 7 units.   For Pre-Meal  - 339 give 8 units.   For Pre-Meal  - 364 give 9 units.   For Pre-Meal BG greater than or equal to 365 give 10 units    Before bedtime---  For  - 224 give 1 units.   For   - 249 give 2 units.   For  - 274 give 3 units.   For  - 299 give 4 units.   For  - 324 give 5 units.   For  - 349 give 6 units.   For BG greater than or equal to 350 give 7 units.     -check blood sugar before meals, bedtime, in the middle of the night (if awake) and record-bring to the clinic follow up  -follow up placed for [facility] in 5-7 days, patient can also go to [facility] if he would like, but encourage close follow up in 5-7 days    Patient will need these supplies/medications ordered:  Cube [employer] Contour Next glucose meter-Meter and starter kit provided   Rosalio Contour Next test strips   Rosalio Microlet Lancets   Assuming need for glargine:   Semglee (glargine) insulin pens   Novolog flexpens   B-D 4 mm mario alberto pen needles   Alcohol Wipes   Sharps container     Interval History and Assessment: interval glucose trend reviewed:         BG still above target.  Will increase Lantus to 26 units once daily, increase novolog to 1:10g CHO coverage with meals, snacks. He has good understanding of basal, bolus insulin.  We talked through sick day plans, exercise plans, etc.  He asked very good questions.      Ketones 6.3 yesterday--given 1L LR bolus and increase fluid intake.  Repeat serum ketones 1.6 today.  GFR>90.     He wants to see [facility] for follow up, request placed 1/19/22.      Current nutritional intake and type: Orders Placed This Encounter      Moderate Consistent Carb (60 g CHO per Meal) Diet      Planned Procedures/surgeries: none  Steroid planning: none  D5W-containing solutions/medications: none    PTA Diabetes Regimen: N/A    Discharge Planning: possibly today           Diabetes History:   Type of Diabetes: Type 1 Diabetes Mellitus  Lab Results   Component Value Date    A1C 11.6 01/18/2022              Review of Systems:     The Review of Systems is negative other than noted in the Interval History.           Medications:     Current Facility-Administered  Medications   Medication     glucose gel 15-30 g    Or     dextrose 50 % injection 25-50 mL    Or     glucagon injection 1 mg     enoxaparin ANTICOAGULANT (LOVENOX) injection 40 mg     insulin aspart (NovoLOG) injection (RAPID ACTING)     insulin aspart (NovoLOG) injection (RAPID ACTING)     insulin aspart (NovoLOG) injection (RAPID ACTING)     insulin aspart (NovoLOG) injection (RAPID ACTING)     insulin glargine (LANTUS PEN) injection 26 Units            Physical Exam:    /79 (BP Location: Left arm)   Pulse 64   Temp 97.9  F (36.6  C) (Oral)   Resp 12   Ht 1.829 m (6')   Wt 72.2 kg (159 lb 2.8 oz)   SpO2 99%   BMI 21.59 kg/m    General: pleasant, in no distress.   HEENT: normocephalic, atraumatic. Oral mucous membranes moist.   Lungs: unlabored respiration, no cough  ABD: rounded, nondistended  Skin: warm and dry, no obvious lesions  MSK:  moves all extremities  Lymp:  no LE edema   Mental status:  alert, oriented to self, place, time  Psych:  affect, calm and appropriate interaction             Data:     Recent Labs   Lab 01/20/22  0616 01/20/22  0239 01/19/22  2156 01/19/22  1802 01/19/22  1210 01/19/22  0919   * 182* 211* 241* 242* 320*     Lab Results   Component Value Date    WBC 7.8 01/18/2022    HGB 15.5 01/18/2022    HCT 43.7 01/18/2022    MCV 89 01/18/2022     01/18/2022     Lab Results   Component Value Date     01/20/2022     01/19/2022     01/18/2022    POTASSIUM 3.4 01/20/2022    POTASSIUM 3.7 01/19/2022    POTASSIUM 3.7 01/19/2022    CHLORIDE 104 01/20/2022    CHLORIDE 104 01/19/2022    CHLORIDE 109 01/18/2022    CO2 28 01/20/2022    CO2 22 01/19/2022    CO2 21 01/18/2022     (H) 01/20/2022     (H) 01/20/2022     (H) 01/19/2022     Lab Results   Component Value Date    BUN 17 01/20/2022    BUN 17 01/19/2022    BUN 18 01/18/2022     Lab Results   Component Value Date    TSH 2.64 01/18/2022     Lab Results   Component Value Date    AST  10 01/18/2022    AST 17 01/18/2022    ALT 27 01/18/2022    ALT 26 01/18/2022    ALKPHOS 74 01/18/2022    ALKPHOS 89 01/18/2022           35 minutes spent on the date of the encounter doing chart review, history and exam, documentation and further activities per the note      Over 50% of my time on the unit was spent counseling the patient and/or coordinating care regarding acute hyperglycemia management.  See note for details.    To contact Endocrine Diabetes service:   From 8AM-4PM: page inpatient diabetes provider that is following the patient  For questions or updates from 4PM-8AM: page the diabetes job code for on call fellow: [phone]    VIDAL Olmedo, CNP  Inpatient Diabetes Management Service  Pager [phone]

## 2022-01-21 ENCOUNTER — VIRTUAL VISIT (OUTPATIENT)
Dept: ENDOCRINOLOGY | Facility: CLINIC | Age: 27
End: 2022-01-21
Payer: COMMERCIAL

## 2022-01-21 ENCOUNTER — PRE VISIT (OUTPATIENT)
Dept: ENDOCRINOLOGY | Facility: CLINIC | Age: 27
End: 2022-01-21

## 2022-01-21 DIAGNOSIS — E10.65 TYPE 1 DIABETES MELLITUS WITH HYPERGLYCEMIA (H): Primary | ICD-10-CM

## 2022-01-21 PROCEDURE — 99204 OFFICE O/P NEW MOD 45 MIN: CPT | Mod: 95 | Performed by: INTERNAL MEDICINE

## 2022-01-21 RX ORDER — PROCHLORPERAZINE 25 MG/1
SUPPOSITORY RECTAL
Qty: 1 EACH | Refills: 3 | Status: SHIPPED | OUTPATIENT
Start: 2022-01-21 | End: 2023-02-27

## 2022-01-21 RX ORDER — PROCHLORPERAZINE 25 MG/1
SUPPOSITORY RECTAL
Qty: 1 EACH | Refills: 0 | Status: SHIPPED | OUTPATIENT
Start: 2022-01-21

## 2022-01-21 RX ORDER — PROCHLORPERAZINE 25 MG/1
SUPPOSITORY RECTAL
Qty: 3 EACH | Refills: 11 | Status: SHIPPED | OUTPATIENT
Start: 2022-01-21 | End: 2023-01-12

## 2022-01-21 NOTE — PROGRESS NOTES
Louie Alicia  is being evaluated via a billable video visit.      How would you like to obtain your AVS? MyChart  For the video visit, send the invitation by: Send to e-mail at: [email or web address]  Will anyone else be joining your video visit? No

## 2022-01-21 NOTE — PROGRESS NOTES
Endocrinology Note         Louie is a 26 year old male presents today for newly diagnosed type 1 diabetes    HPI  Louie is a 26 year old male previously healthy presents today for newly diagnosed type 1 diabetes    He was recently admitted for newly diagnose type 1 diabetes after he was found to have random glucose of 700s. He was initially went to see the doctor after noticing significant weight loss, increased fatigue and increased urination. He found to have DKA and was started IVF and insulin drip. A1c was 11.6, PALOMO Ab was positive >250. TSH 2.64. He was subsequently transitioned to subcutaneous insulin later. He was discharged yesterday. He met with CDE during hospitalization and feels confident with carbohydrate counting.    In retrospect, he reports 2 months of 20 lbs weight loss. His usual baseline weight was 175 lbs. Weight during admission was 159 lbs. In the last 2 weeks, he noticed increased fatigue and urination. He denied blurred vision, SOB, chest pain, palpitation, abdominal pain, nausea, vomiting, skin changes or hair loss. His sleep is good.     Current medication regimen  Glargine (Semglee) -- 26 units at night  Novolog 1 unit per 10 gram carb with meal  Novolog correction scale  Before meals---  For Pre-Meal  - 164 give 1 unit.   For Pre-Meal  - 189 give 2 units.   For Pre-Meal  - 214 give 3 units.   For Pre-Meal  - 239 give 4 units.   For Pre-Meal  - 264 give 5 units.   For Pre-Meal  - 289 give 6 units.   For Pre-Meal  - 314 give 7 units.   For Pre-Meal  - 339 give 8 units.   For Pre-Meal  - 364 give 9 units.   For Pre-Meal BG greater than or equal to 365 give 10 units     Before bedtime---  For  - 224 give 1 units.   For  - 249 give 2 units.   For  - 274 give 3 units.   For  - 299 give 4 units.   For  - 324 give 5 units.   For  - 349 give 6 units.   For BG greater than or equal to 350 give 7  units.     Glucose checked - predinner 188, bedtime 221, 210, this AM 87  Exercise -- 20-30 minutes -- Peloton bike about 2-3 times per week  Denied family hx of type 1 or type 2 diabetes or autoimmune diseases    Past Medical History  Recently diagnosed type 1 diabetes    Allergies  Allergies   Allergen Reactions     Amoxicillin      1/18/21: Pt does not know what his reaction was (occurred as a child)     Cefprozil      1/18/21: Pt does not know what his reaction was (occurred as a child)       Medications  Current Outpatient Medications   Medication Sig Dispense Refill     Alcohol Swabs PADS Use to swab the area of the injection or veronica as directed . Per insurance coverage 100 each 0     blood glucose (NO BRAND SPECIFIED) lancets standard To use to test glucose level in the blood. Use to test blood sugar  4  times daily as directed. To accompany glucose monitor brands per insurance coverage. 100 each 0     blood glucose (NO BRAND SPECIFIED) test strip To use to test glucose level in the blood. Use to test blood sugar 4 times daily as directed. To accompany glucose monitor brands per insurance coverage. 100 strip 11     blood glucose monitoring (NO BRAND SPECIFIED) meter device kit Use as directed. Per insurance coverage 1 kit 0     glucagon 1 MG kit Inject 1 mg Subcutaneous once as needed for low blood sugar (Use as directed by provider) 1 each 0     glucose (BD GLUCOSE) 4 g chewable tablet Take 4 tablets by mouth every 15 minutes as needed for low blood sugar 50 tablet 0     glucose 40 % (400 mg/mL) gel 15 g every 15 minutes by mouth as needed for low blood sugar. Oral gel is preferable for conscious and able to swallow patient. 112.5 g 0     insulin aspart (NOVOLOG PEN) 100 UNIT/ML pen Three times daily with meals. DOSE: 1 units per 10 grams of carbohydrate.  Only chart total amount of units given.  Do not give if pre-prandial glucose is less than 60 mg/dL. If given at mealtime, administer within 30 minutes of  start of meal. 15 mL 1     insulin aspart (NOVOLOG PEN) 100 UNIT/ML pen With snacks or supplements. DOSE:  1 units per 10 grams of carbohydrate. Only chart total amount of units given.  Do not give if pre-prandial glucose is less than 60 mg/dL. If given at mealtime, administer within 30 minutes of start of meal. 15 mL 1     insulin aspart (NOVOLOG PEN) 100 UNIT/ML pen Inject 1-10 Units Subcutaneous 3 times daily (before meals) Correction Scale - HIGH INSULIN RESISTANCE DOSING   Do Not give Correction Insulin if Pre-Meal BG less than 140. For Pre-Meal  - 164 give 1 unit. For Pre-Meal  - 189 give 2 units. For Pre-Meal  - 214 give 3 units. For Pre-Meal  - 239 give 4 units. For Pre-Meal  - 264 give 5 units. For Pre-Meal  - 289 give 6 units. For Pre-Meal  - 314 give 7 units. For Pre-Meal  - 339 give 8 units. For Pre-Meal  - 364 give 9 units.  For Pre-Meal BG greater than or equal to 365 give 10 units To be given with prandial insulin, and based on pre-meal blood glucose. Notify provider if glucose greater than or equal to 350 mg/dL after administration of correction dose. If given at mealtime, administer within 30 minutes of start of meal. 15 mL 1     insulin aspart (NOVOLOG PEN) 100 UNIT/ML pen Inject 1-7 Units Subcutaneous At Bedtime HIGH INSULIN RESISTANCE DOSING  Do Not give Bedtime Correction Insulin if BG less than 200. For  - 224 give 1 units. For  - 249 give 2 units. For  - 274 give 3 units. For  - 299 give 4 units. For  - 324 give 5 units. For  - 349 give 6 units. For BG greater than or equal to 350 give 7 units. Notify provider if glucose greater than or equal to 350 mg/dL after administration of correction dose. If given at mealtime, administer within 30 minutes of start of meal. 15 mL 1     Insulin Glargine-yfgn (SEMGLEE, YFGN,) 100 UNIT/ML SOPN Inject 26 Units Subcutaneous At Bedtime 15 mL 1     insulin pen needle (32G  X 4 MM) 32G X 4 MM miscellaneous Use as directed by provider. Per insurance coverage 100 each 0     multivitamin w/minerals (THERA-VIT-M) tablet Take 1 tablet by mouth daily       Sharps Container MISC Use as directed to dispose of needles, lancets and other sharps. Per Insurance coverage 1 each 0     Family History  family history includes C.A.D. in his paternal grandfather; Prostate Cancer in his paternal grandfather.   Father and mother are healthy    Social History  Social History     Tobacco Use     Smoking status: Never Smoker     Smokeless tobacco: Never Used   Substance Use Topics     Alcohol use: None     Drug use: None   drink alcohol socially -- usually beer  No smoking  No drug  Work --    Single, no children    ROS  10 points ROS were negative otherwise mentioned in HPI    Physical Exam  Limited due to virtual visit  Constitutional: no distress, comfortable, pleasant   Eyes: anicteric  Psychological: appropriate mood       RESULTS  I have personally reviewed labs and images. I also reviewed labs with patient and discussed the result and plan of care.  ENDO DIABETES Latest Ref Rng & Units 1/18/2022   A1C <=5.6 % 11.6 (H)   ALT 0 - 70 U/L 26   AST 0 - 45 U/L 17   CREATININE 0.66 - 1.25 mg/dL 1.42 (H)     ASSESSMENT:    Louie is a 26 year old male previously healthy presents today for newly diagnosed type 1 diabetes    1) newly diagnosed type 1 diabetes: after presented with 20 lbs weight loss, increased urination and fatigue in the past 2 months. Found to have DKA and was hospitalized earlier this week. He was treated with IVF and insulin drip and subsequently switched to subcutaneous insulin. He met with inpatient diabetes, CDE and RD in the hospital. He feels quite knowledgeble about type 1 diabetes and management including carb counting, hypoglycemic rescue.  - will continue with current insulin regimen and update glucose log next week via [employer] message  - discuss CGM -- he will be  benefit for CGM use to monitor glucose and trend  - place order for Dexcom G6  - discuss insulin pump for future use    2) DM complications:  Retinopathy: no blurred vision, has not had eye exam  Nephropathy: will test once his glucose is stable  Neuropathy: none, will test at the next in-person visit    PLAN:   Glargine (Semglee) -- 26 units at night  Novolog 1 unit per 10 gram carb with meal  Novolog correction scale  Before meals---  For Pre-Meal  - 164 give 1 unit.   For Pre-Meal  - 189 give 2 units.   For Pre-Meal  - 214 give 3 units.   For Pre-Meal  - 239 give 4 units.   For Pre-Meal  - 264 give 5 units.   For Pre-Meal  - 289 give 6 units.   For Pre-Meal  - 314 give 7 units.   For Pre-Meal  - 339 give 8 units.   For Pre-Meal  - 364 give 9 units.   For Pre-Meal BG greater than or equal to 365 give 10 units     Before bedtime---  For  - 224 give 1 units.   For  - 249 give 2 units.   For  - 274 give 3 units.   For  - 299 give 4 units.   For  - 324 give 5 units.   For  - 349 give 6 units.   For BG greater than or equal to 350 give 7 units.     Return in 6 weeks    Start: 01/21/2022 09:52 am  Stop: 01/21/2022 10:28 am  VDO duration 36 minutes    External notes/medical records independently reviewed, labs and imaging independently reviewed, medical management and tests to be discussed/communicated to patient.    Time: I spent 57 minutes spent on the date of the encounter preparing to see patient (including chart review and preparation), obtaining and or reviewing additional medical history, performing a physical exam and evaluation, documenting clinical information in the electronic health record, independently interpreting results, communicating results to the patient and coordinating care.    Laurence Menendez MD  Division of Diabetes and Endocrinology  Department of Medicine  [phone]

## 2022-01-21 NOTE — TELEPHONE ENCOUNTER
RECORDS RECEIVED FROM: Internal   DATE RECEIVED: 1.21.22   NOTES (FOR ALL VISITS) STATUS DETAILS   OFFICE NOTES from referring provider [facility] follow-up   OFFICE NOTES from other specialist N/A    ED NOTES Internal 1.18.22 TABITHA Nagel [facility]  1.17.22 Shayy [facility]   OPERATIVE REPORT  (thyroid, pituitary, adrenal, parathyroid) N/A    MEDICATION LIST Internal    IMAGING      DEXASCAN N/A    MRI (BRAIN) N/A    XR (Chest) N/A    CT (HEAD/NECK/CHEST/ABDOMEN) N/A    NUCLEAR  N/A    ULTRASOUND (HEAD/NECK) N/A    LABS     DIABETES: HBGA1C, CREATININE, FASTING LIPIDS, MICROALBUMIN URINE, POTASSIUM, TSH, T4    THYROID: TSH, T4, CBC, THYRODLONULIN, TOTAL T3, FREE T4, CALCITONIN, CEA Internal

## 2022-01-21 NOTE — DISCHARGE SUMMARY
[facility]  Discharge Summary - Medicine & Pediatrics       Date of Admission:  1/18/2022  Date of Discharge:  1/20/2022 12:38 PM  Discharging Provider: Dr. Nagel  Discharge Service: Medicine Service, DANNY TEAM 3    Discharge Diagnoses   Diabetic ketoacidosis  Diabetes mellitus, Type 1  Acute kidney injury      Follow-ups Needed After Discharge   Follow-up Appointments     Follow Up ([facility])      Follow up with [facility] Endocrinology at [facility] clinic in 5-7 days (can be   virtual appt) for new type 1 DM diagnosis and hospital follow up.      Appointments on [place] and/or [facility] (with [facility] or [facility]   provider or service). Call [phone] if you haven't heard regarding   these appointments within 7 days of discharge.           Unresulted Labs Ordered in the Past 30 Days of this Admission       Date and Time Order Name Status Description    1/18/2022  1:31 PM Glutamic acid decarboxylase antibody In process     1/18/2022  6:55 AM Blood Culture Arm, Right Preliminary         These results will be followed up by the endocrine team.    Discharge Disposition   Discharged to home  Condition at discharge: Good    Hospital Course   Louie Alicia was admitted on 1/18/2022 for diabetic ketoacidosis.  The following problems were addressed during his hospitalization:    #Diabetic ketoacidosis  #Diabetes mellitus, type 1, new onset  Tito presented with 2 weeks of gradually increasing polyuria, polydipsia, and fatigue accompanied by otherwise unexplained weight loss. His blood sugar in clinic was 729 so he was referred to the [facility] ED.  There he was found to be in diabetic ketoacidosis with blood glucose of 443, pH 7.17, bicarb 11, A1c 11.6, positive urine and serum ketones.  No prior personal or family history of diabetes, no clear inciting event. The DKA resolved rapidly on an insulin infusion and the patient was transitioned to subcutaneous insulin on the  evening of 1/18. He tolerated this transition well and was able to discharge home on the below insulin regimen on 1/20. Prior to discharge, he and his family received standard diabetes education.   -Endocrine follow up in clinic within 1 week.    #DENISE  Presented with DENISE, creatinine to 1.42.  Unclear baseline but should be lower than this in an otherwise healthy young man.  Creatinine down to 0.7 by 1/18 AM.    Discharge insulin regimen:  -Glargine (Semglee) pen 26 units daily-give at 4pm (can move to morning or bedtime-whatever works best for patient schedule)  -Novolog 1 units for every 10 grams of carbohydrates with meals, snacks  -Novolog sliding scale insulin   Before meals---  For Pre-Meal  - 164 give 1 unit.   For Pre-Meal  - 189 give 2 units.   For Pre-Meal  - 214 give 3 units.   For Pre-Meal  - 239 give 4 units.   For Pre-Meal  - 264 give 5 units.   For Pre-Meal  - 289 give 6 units.   For Pre-Meal  - 314 give 7 units.   For Pre-Meal  - 339 give 8 units.   For Pre-Meal  - 364 give 9 units.   For Pre-Meal BG greater than or equal to 365 give 10 units     Before bedtime---  For  - 224 give 1 units.   For  - 249 give 2 units.   For  - 274 give 3 units.   For  - 299 give 4 units.   For  - 324 give 5 units.   For  - 349 give 6 units.   For BG greater than or equal to 350 give 7 units.      -check blood sugar before meals, bedtime, in the middle of the night (if awake) and record-bring to the clinic follow up  -follow up placed for [facility] in 5-7 days, patient can also go to [facility] if he would like, but encourage close follow up in 5-7 days    Consultations This Hospital Stay   ENDOCRINE DIABETES ADULT IP CONSULT  CNS DIABETES IP CONSULT  DIABETES EDUCATION IP CONSULT  PHARMACY LIAISON FOR MEDICATION COVERAGE CONSULT  VASCULAR ACCESS CARE ADULT IP CONSULT  CARE MANAGEMENT / SOCIAL WORK IP CONSULT  PHARMACY  LIAISON FOR MEDICATION COVERAGE CONSULT    Code Status   Full Code       The patient was discussed with Dr. Nagel.    Bran Orlando MD   Med-Peds PGY2  [place] 3 Service  [facility] 6D INTERMEDIATE CARE  [address] [address]  Phone: [phone]  Fax: [phone]  ______________________________________________________________________    Physical Exam   Vital Signs: Temp: 97.9  F (36.6  C) Temp src: Oral BP: 118/79 Pulse: 64   Resp: 12 SpO2: 99 % O2 Device: None (Room air)    Weight: 159 lbs 2.75 oz  Constitutional: awake, alert, cooperative, no apparent distress  Eyes: EOMI, sclerae clear, conjunctivae normal  Respiratory: No increased work of breathing, good air movement, clear to auscultation bilaterally, no crackles or wheezing  Cardiovascular: RRR, no murmurs or gallops, warm and well perfused, no peripheral edema  Skin: no rashes and no jaundice  Musculoskeletal: No susan deformity  Full range of motion noted.   Neurologic: Awake, alert, oriented to name, place and time.  Cranial nerves II-XII are grossly intact. Normal gait and strength.  Neuropsychiatric: Calm, appropriate affect      Primary Care Physician   Calvin Greenberg    Discharge Orders      Follow Up ([facility])    Follow up with [facility] Endocrinology at [facility] clinic in 5-7 days (can be virtual appt) for new type 1 DM diagnosis and hospital follow up.      Appointments on [place] and/or [facility] (with [facility] or [facility] provider or service). Call [phone] if you haven't heard regarding these appointments within 7 days of discharge.     Reason for your hospital stay    You were hospitalized for a condition called diabetic ketoacidosis caused by a new onset of type 1 diabetes.  We treated you with insulin, IV fluids, and electrolyte replacement and were able to get your blood sugars down to a normal range.  You are seen by your endocrinology team and our diabetes educator to help to learn the basics of  taking care of your body with diabetes.  You will need to follow-up in endocrinology clinic within 1 week.  We have placed a referral for endocrinology follow-up in the [facility] system, but you can go to an endocrinologist in a different system if you prefer-- just make sure you are able to get in within 1 week.    The endocrinology team has supplied detailed instructions on your insulin regimen and other diabetes care elsewhere in this packet.    Is was pleasure getting to meet you and be part of your team.  I wish you the best in the future.     Activity    Your activity upon discharge: activity as tolerated     Full Code     Diet    Follow this diet upon discharge:      Moderate Consistent Carb (60 g CHO per Meal) Diet       Significant Results and Procedures   Most Recent 6 glucoses:Recent Labs   Lab Test 01/20/22  1142 01/20/22  0836 01/20/22  0616 01/20/22  0239 01/19/22  2156 01/19/22  1802   * 222* 214* 182* 211* 241*       Discharge Medications   Discharge Medication List as of 1/20/2022 10:09 AM        START taking these medications    Details   Alcohol Swabs PADS Use to swab the area of the injection or veronica as directed . Per insurance coverage, Disp-100 each, R-0, E-Prescribe      blood glucose (NO BRAND SPECIFIED) lancets standard To use to test glucose level in the blood. Use to test blood sugar  4  times daily as directed. To accompany glucose monitor brands per insurance coverage.Disp-100 each, [id number]      blood glucose (NO BRAND SPECIFIED) test strip To use to test glucose level in the blood. Use to test blood sugar 4 times daily as directed. To accompany glucose monitor brands per insurance coverage., Disp-100 strip, R-11, E-Prescribe      blood glucose monitoring (NO BRAND SPECIFIED) meter device kit Use as directed. Per insurance coverageDisp-1 kit, [id number]      glucagon 1 MG kit Inject 1 mg Subcutaneous once as needed for low blood sugar (Use as directed by  provider), Disp-1 each, R-0, E-Prescribe      glucose (BD GLUCOSE) 4 g chewable tablet Take 4 tablets by mouth every 15 minutes as needed for low blood sugar, Disp-50 tablet, R-0, E-Prescribe      glucose 40 % (400 mg/mL) gel 15 g every 15 minutes by mouth as needed for low blood sugar. Oral gel is preferable for conscious and able to swallow patient.Disp-112.5 g, [id number]      !! insulin aspart (NOVOLOG PEN) 100 UNIT/ML pen Three times daily with meals. DOSE: 1 units per 10 grams of carbohydrate.  Only chart total amount of units given.  Do not give if pre-prandial glucose is less than 60 mg/dL. If given at mealtime, administer within 30 minutes of start of meal., Disp-15 m L, R-1, E-Prescribe      !! insulin aspart (NOVOLOG PEN) 100 UNIT/ML pen With snacks or supplements. DOSE:  1 units per 10 grams of carbohydrate. Only chart total amount of units given.  Do not give if pre-prandial glucose is less than 60 mg/dL. If given at mealtime, administer within 30 minutes of start of meal., Disp-15 mL,  R-1, E-Prescribe      !! insulin aspart (NOVOLOG PEN) 100 UNIT/ML pen Inject 1-10 Units Subcutaneous 3 times daily (before meals) Correction Scale - HIGH INSULIN RESISTANCE DOSING   Do Not give Correction Insulin if Pre-Meal BG less than 140. For Pre-Meal  - 164 give 1 unit. For Pre-Meal  - 189 give 2 units. Fo r Pre-Meal  - 214 give 3 units. For Pre-Meal  - 239 give 4 units. For Pre-Meal  - 264 give 5 units. For Pre-Meal  - 289 give 6 units. For Pre-Meal  - 314 give 7 units. For Pre-Meal  - 339 give 8 units. For Pre-Meal BG  340 - 364 give 9 units.  For Pre-Meal BG greater than or equal to 365 give 10 units To be given with prandial insulin, and based on pre-meal blood glucose. Notify provider if glucose greater than or equal to 350 mg/dL after administration of correction d ose. If given at mealtime, administer within 30 minutes of start of meal., Disp-15 mL, R-1,  E-Prescribe      !! insulin aspart (NOVOLOG PEN) 100 UNIT/ML pen Inject 1-7 Units Subcutaneous At Bedtime HIGH INSULIN RESISTANCE DOSING  Do Not give Bedtime Correction Insulin if BG less than 200. For  - 224 give 1 units. For  - 249 give 2 units. For  - 274 give 3 units. For  - 299 give 4 unit s. For  - 324 give 5 units. For  - 349 give 6 units. For BG greater than or equal to 350 give 7 units. Notify provider if glucose greater than or equal to 350 mg/dL after administration of correction dose. If given at mealtime, administer wit hin 30 minutes of start of meal., Disp-15 mL, R-1, E-Prescribe      Insulin Glargine-yfgn (SEMGLEE, YFGN,) 100 UNIT/ML SOPN Inject 26 Units Subcutaneous At Bedtime, Disp-15 mL, R-1, E-Prescribe      insulin pen needle (32G X 4 MM) 32G X 4 MM miscellaneous Use as directed by provider. Per insurance coverageDisp-100 each, [id number]      Sharps Container MISC Use as directed to dispose of needles, lancets and other sharps. Per Insurance coverage, Disp-1 each, R-0, E-Prescribe       !! - Potential duplicate medications found. Please discuss with provider.        CONTINUE these medications which have NOT CHANGED    Details   multivitamin w/minerals (THERA-VIT-M) tablet Take 1 tablet by mouth daily, Historical           Allergies   Allergies   Allergen Reactions    Amoxicillin      1/18/21: Pt does not know what his reaction was (occurred as a child)    Cefprozil      1/18/21: Pt does not know what his reaction was (occurred as a child)         Staff Addendum to Discharge Summary  Date of Service: 1/20/2022  I have seen and examined the patient, reviewed the data and discussed the plan of care with the service team.  I agree with the above documentation.    >30 minutes spent in discharge, including >50% in counseling and coordination of care, medication review and plan of care recommended on follow up. Questions were answered at length with patient  including medication counseling and follow-up care.     Calvin Cadena  (PCP) was contacted electronically at the time of discharge, so as to bridge from hospital to outpatient care.   It was our pleasure to care for the patient during this hospitalization. Please do not hesitate to contact me should there be questions regarding the hospital course or discharge plan.      Yoni Nagel MD   of Medicine  [facility]

## 2022-01-21 NOTE — PATIENT INSTRUCTIONS
Semglee -- 26 units at night  Novolog 1 unit per 10 gram carb with meal  Continue current correction insulin for meal    Update glucose number next week    Return in 6-8 weeks    If you have any questions, please do not hesitate to call clinic line at [phone] and ask for Endocrinology clinic.  If you need to fax, please fax to clinic fax number at [phone]    After clinic hours or weekends, please contact [phone] and ask for Endocrinologist-on call      Sincerely,    Laurence Menendez MD  Endocrinology

## 2022-01-21 NOTE — LETTER
1/21/2022     RE: Louie Alicia  [address]  [place] [address]     Dear Colleague,Thank you for referring your patient, Louie Alicia, to the [facility] ENDOCRINOLOGY [facility]IC [place] at [facility]. Please see a copy of my visit note below.    Louie Alicia  is being evaluated via a billable video visit.      How would you like to obtain your AVS? MyChart  For the video visit, send the invitation by: Send to e-mail at: [email or web address]  Will anyone else be joining your video visit? No         Endocrinology Note         Louie is a 26 year old male presents today for newly diagnosed type 1 diabetes    HPI  Louie is a 26 year old male previously healthy presents today for newly diagnosed type 1 diabetes    He was recently admitted for newly diagnose type 1 diabetes after he was found to have random glucose of 700s. He was initially went to see the doctor after noticing significant weight loss, increased fatigue and increased urination. He found to have DKA and was started IVF and insulin drip. A1c was 11.6, PALOMO Ab was positive >250. TSH 2.64. He was subsequently transitioned to subcutaneous insulin later. He was discharged yesterday. He met with CDE during hospitalization and feels confident with carbohydrate counting.    In retrospect, he reports 2 months of 20 lbs weight loss. His usual baseline weight was 175 lbs. Weight during admission was 159 lbs. In the last 2 weeks, he noticed increased fatigue and urination. He denied blurred vision, SOB, chest pain, palpitation, abdominal pain, nausea, vomiting, skin changes or hair loss. His sleep is good.     Current medication regimen  Glargine (Semglee) -- 26 units at night  Novolog 1 unit per 10 gram carb with meal  Novolog correction scale  Before meals---  For Pre-Meal  - 164 give 1 unit.   For Pre-Meal  - 189 give 2 units.   For Pre-Meal  - 214 give 3 units.    For Pre-Meal  - 239 give 4 units.   For Pre-Meal  - 264 give 5 units.   For Pre-Meal  - 289 give 6 units.   For Pre-Meal  - 314 give 7 units.   For Pre-Meal  - 339 give 8 units.   For Pre-Meal  - 364 give 9 units.   For Pre-Meal BG greater than or equal to 365 give 10 units     Before bedtime---  For  - 224 give 1 units.   For  - 249 give 2 units.   For  - 274 give 3 units.   For  - 299 give 4 units.   For  - 324 give 5 units.   For  - 349 give 6 units.   For BG greater than or equal to 350 give 7 units.     Glucose checked - predinner 188, bedtime 221, 210, this AM 87  Exercise -- 20-30 minutes -- Peloton bike about 2-3 times per week  Denied family hx of type 1 or type 2 diabetes or autoimmune diseases    Past Medical History  Recently diagnosed type 1 diabetes    Allergies  Allergies   Allergen Reactions     Amoxicillin      1/18/21: Pt does not know what his reaction was (occurred as a child)     Cefprozil      1/18/21: Pt does not know what his reaction was (occurred as a child)     Medications  Current Outpatient Medications   Medication Sig Dispense Refill     Alcohol Swabs PADS Use to swab the area of the injection or veronica as directed . Per insurance coverage 100 each 0     blood glucose (NO BRAND SPECIFIED) lancets standard To use to test glucose level in the blood. Use to test blood sugar  4  times daily as directed. To accompany glucose monitor brands per insurance coverage. 100 each 0     blood glucose (NO BRAND SPECIFIED) test strip To use to test glucose level in the blood. Use to test blood sugar 4 times daily as directed. To accompany glucose monitor brands per insurance coverage. 100 strip 11     blood glucose monitoring (NO BRAND SPECIFIED) meter device kit Use as directed. Per insurance coverage 1 kit 0     glucagon 1 MG kit Inject 1 mg Subcutaneous once as needed for low blood sugar (Use as directed by provider) 1 each 0      glucose (BD GLUCOSE) 4 g chewable tablet Take 4 tablets by mouth every 15 minutes as needed for low blood sugar 50 tablet 0     glucose 40 % (400 mg/mL) gel 15 g every 15 minutes by mouth as needed for low blood sugar. Oral gel is preferable for conscious and able to swallow patient. 112.5 g 0     insulin aspart (NOVOLOG PEN) 100 UNIT/ML pen Three times daily with meals. DOSE: 1 units per 10 grams of carbohydrate.  Only chart total amount of units given.  Do not give if pre-prandial glucose is less than 60 mg/dL. If given at mealtime, administer within 30 minutes of start of meal. 15 mL 1     insulin aspart (NOVOLOG PEN) 100 UNIT/ML pen With snacks or supplements. DOSE:  1 units per 10 grams of carbohydrate. Only chart total amount of units given.  Do not give if pre-prandial glucose is less than 60 mg/dL. If given at mealtime, administer within 30 minutes of start of meal. 15 mL 1     insulin aspart (NOVOLOG PEN) 100 UNIT/ML pen Inject 1-10 Units Subcutaneous 3 times daily (before meals) Correction Scale - HIGH INSULIN RESISTANCE DOSING   Do Not give Correction Insulin if Pre-Meal BG less than 140. For Pre-Meal  - 164 give 1 unit. For Pre-Meal  - 189 give 2 units. For Pre-Meal  - 214 give 3 units. For Pre-Meal  - 239 give 4 units. For Pre-Meal  - 264 give 5 units. For Pre-Meal  - 289 give 6 units. For Pre-Meal  - 314 give 7 units. For Pre-Meal  - 339 give 8 units. For Pre-Meal  - 364 give 9 units.  For Pre-Meal BG greater than or equal to 365 give 10 units To be given with prandial insulin, and based on pre-meal blood glucose. Notify provider if glucose greater than or equal to 350 mg/dL after administration of correction dose. If given at mealtime, administer within 30 minutes of start of meal. 15 mL 1     insulin aspart (NOVOLOG PEN) 100 UNIT/ML pen Inject 1-7 Units Subcutaneous At Bedtime HIGH INSULIN RESISTANCE DOSING  Do Not give Bedtime Correction  Insulin if BG less than 200. For  - 224 give 1 units. For  - 249 give 2 units. For  - 274 give 3 units. For  - 299 give 4 units. For  - 324 give 5 units. For  - 349 give 6 units. For BG greater than or equal to 350 give 7 units. Notify provider if glucose greater than or equal to 350 mg/dL after administration of correction dose. If given at mealtime, administer within 30 minutes of start of meal. 15 mL 1     Insulin Glargine-yfgn (SEMGLEE, YFGN,) 100 UNIT/ML SOPN Inject 26 Units Subcutaneous At Bedtime 15 mL 1     insulin pen needle (32G X 4 MM) 32G X 4 MM miscellaneous Use as directed by provider. Per insurance coverage 100 each 0     multivitamin w/minerals (THERA-VIT-M) tablet Take 1 tablet by mouth daily       Sharps Container MISC Use as directed to dispose of needles, lancets and other sharps. Per Insurance coverage 1 each 0     Family History  family history includes C.A.D. in his paternal grandfather; Prostate Cancer in his paternal grandfather.   Father and mother are healthy    Social History  Social History     Tobacco Use     Smoking status: Never Smoker     Smokeless tobacco: Never Used   Substance Use Topics     Alcohol use: None     Drug use: None   drink alcohol socially -- usually beer  No smoking  No drug  Work --    Single, no children    ROS  10 points ROS were negative otherwise mentioned in HPI    Physical Exam  Limited due to virtual visit  Constitutional: no distress, comfortable, pleasant   Eyes: anicteric  Psychological: appropriate mood       RESULTS  I have personally reviewed labs and images. I also reviewed labs with patient and discussed the result and plan of care.  ENDO DIABETES Latest Ref Rng & Units 1/18/2022   A1C <=5.6 % 11.6 (H)   ALT 0 - 70 U/L 26   AST 0 - 45 U/L 17   CREATININE 0.66 - 1.25 mg/dL 1.42 (H)     ASSESSMENT:    Louie is a 26 year old male previously healthy presents today for newly diagnosed type 1 diabetes    1)  newly diagnosed type 1 diabetes: after presented with 20 lbs weight loss, increased urination and fatigue in the past 2 months. Found to have DKA and was hospitalized earlier this week. He was treated with IVF and insulin drip and subsequently switched to subcutaneous insulin. He met with inpatient diabetes, CDE and RD in the hospital. He feels quite knowledgeble about type 1 diabetes and management including carb counting, hypoglycemic rescue.  - will continue with current insulin regimen and update glucose log next week via [facility] message  - discuss CGM -- he will be benefit for CGM use to monitor glucose and trend  - place order for Dexcom G6  - discuss insulin pump for future use    2) DM complications:  Retinopathy: no blurred vision, has not had eye exam  Nephropathy: will test once his glucose is stable  Neuropathy: none, will test at the next in-person visit    PLAN:   Glargine (Semglee) -- 26 units at night  Novolog 1 unit per 10 gram carb with meal  Novolog correction scale  Before meals---  For Pre-Meal  - 164 give 1 unit.   For Pre-Meal  - 189 give 2 units.   For Pre-Meal  - 214 give 3 units.   For Pre-Meal  - 239 give 4 units.   For Pre-Meal  - 264 give 5 units.   For Pre-Meal  - 289 give 6 units.   For Pre-Meal  - 314 give 7 units.   For Pre-Meal  - 339 give 8 units.   For Pre-Meal  - 364 give 9 units.   For Pre-Meal BG greater than or equal to 365 give 10 units     Before bedtime---  For  - 224 give 1 units.   For  - 249 give 2 units.   For  - 274 give 3 units.   For  - 299 give 4 units.   For  - 324 give 5 units.   For  - 349 give 6 units.   For BG greater than or equal to 350 give 7 units.     Return in 6 weeks    Start: 01/21/2022 09:52 am  Stop: 01/21/2022 10:28 am  VDO duration 36 minutes    External notes/medical records independently reviewed, labs and imaging independently reviewed, medical management  and tests to be discussed/communicated to patient.    Time: I spent 57 minutes spent on the date of the encounter preparing to see patient (including chart review and preparation), obtaining and or reviewing additional medical history, performing a physical exam and evaluation, documenting clinical information in the electronic health record, independently interpreting results, communicating results to the patient and coordinating care.    Laurence Menendez MD  Division of Diabetes and Endocrinology  Department of Medicine  [phone]

## 2022-01-23 LAB — BACTERIA BLD CULT: NO GROWTH

## 2022-01-26 ENCOUNTER — MYC MEDICAL ADVICE (OUTPATIENT)
Dept: ENDOCRINOLOGY | Facility: CLINIC | Age: 27
End: 2022-01-26
Payer: COMMERCIAL

## 2022-02-07 DIAGNOSIS — E10.10 DIABETIC KETOACIDOSIS WITHOUT COMA ASSOCIATED WITH TYPE 1 DIABETES MELLITUS (H): ICD-10-CM

## 2022-02-08 DIAGNOSIS — E10.10 DIABETIC KETOACIDOSIS WITHOUT COMA ASSOCIATED WITH TYPE 1 DIABETES MELLITUS (H): ICD-10-CM

## 2022-02-08 RX ORDER — ISOPROPYL ALCOHOL 0.7 ML/1
1 SWAB TOPICAL SEE ADMIN INSTRUCTIONS
Qty: 300 EACH | Refills: 11 | Status: SHIPPED | OUTPATIENT
Start: 2022-02-08

## 2022-02-08 NOTE — TELEPHONE ENCOUNTER
Alcohol Swabs PADS    1/21/2022  [facility] Endocrinology Clinic [place]       Laurence Menendez MD    Endocrinology, Diabetes, and Metabolism

## 2022-02-21 NOTE — PROGRESS NOTES
Outcome for 02/21/22 3:32 PM: [employer] message sent   Leonie Martinez on 2/21/2022 at 3:32 PM  Outcome for 02/22/22 1:03 PM: Data emailed to provider   Leonie Martinez on 2/22/2022 at 1:03 PM

## 2022-02-24 NOTE — PROGRESS NOTES
Endocrinology Note         Louie is a 26 year old male presents today for newly diagnosed type 1 diabetes    HPI  Louie is a 26 year old male previously healthy presents today for newly diagnosed type 1 diabetes    He was recently admitted for newly diagnose type 1 diabetes after he was found to have random glucose of 700s. He was initially went to see the doctor after noticing significant weight loss, increased fatigue and increased urination. He found to have DKA and was started IVF and insulin drip. A1c was 11.6, PALOMO Ab was positive >250. TSH 2.64. He was subsequently transitioned to subcutaneous insulin later. He met with CDE during hospitalization and feels confident with carbohydrate counting. In retrospect, he reports 2 months of 20 lbs weight loss. His usual baseline weight was 175 lbs. Weight during admission was 159 lbs. Two weeks prior to admission, he noticed increased fatigue and urination. He denied blurred vision, SOB, chest pain, palpitation, abdominal pain, nausea, vomiting, skin changes or hair loss. His sleep is good.     Interim history  He said that overall he is doing well. He notices more low blood glucose without feeling it.     Using Dexcom in the past month reviewed      Current medication regimen  Glargine (Semglee) -- 18 units at night  Novolog 1 unit per 15-20 gram carb with meal  Novolog correction scale  Before meals---  For Pre-Meal  - 164 give 1 unit.   For Pre-Meal  - 189 give 2 units.   For Pre-Meal  - 214 give 3 units.   For Pre-Meal  - 239 give 4 units.   For Pre-Meal  - 264 give 5 units.   For Pre-Meal  - 289 give 6 units.   For Pre-Meal  - 314 give 7 units.   For Pre-Meal  - 339 give 8 units.   For Pre-Meal  - 364 give 9 units.   For Pre-Meal BG greater than or equal to 365 give 10 units     Before bedtime---  For  - 224 give 1 units.   For  - 249 give 2 units.   For  - 274 give 3 units.   For BG  275 - 299 give 4 units.   For  - 324 give 5 units.   For  - 349 give 6 units.   For BG greater than or equal to 350 give 7 units.       Exercise -- 20-30 minutes -- Peloton bike about 2-3 times per week  Denied family hx of type 1 or type 2 diabetes or autoimmune diseases    Past Medical History  Recently diagnosed type 1 diabetes    Allergies  Allergies   Allergen Reactions     Amoxicillin      1/18/21: Pt does not know what his reaction was (occurred as a child)     Cefprozil      1/18/21: Pt does not know what his reaction was (occurred as a child)       Medications  Current Outpatient Medications   Medication Sig Dispense Refill     Alcohol Swabs (SM ALCOHOL PREP) 70 % PADS Apply 1 swab topically See Admin Instructions with injection or lancet 10 times per day or as directed. 300 each 11     blood glucose (NO BRAND SPECIFIED) lancets standard To use to test glucose level in the blood. Use to test blood sugar  4  times daily as directed. To accompany glucose monitor brands per insurance coverage. 100 each 0     blood glucose (NO BRAND SPECIFIED) test strip To use to test glucose level in the blood. Use to test blood sugar 4 times daily as directed. To accompany glucose monitor brands per insurance coverage. 100 strip 11     blood glucose monitoring (NO BRAND SPECIFIED) meter device kit Use as directed. Per insurance coverage 1 kit 0     Continuous Blood Gluc  (DEXCOM G6 ) VIRI Use to read blood sugars as per 's instructions. 1 each 0     Continuous Blood Gluc Sensor (DEXCOM G6 SENSOR) MISC Change every 10 days. 3 each 11     Continuous Blood Gluc Transmit (DEXCOM G6 TRANSMITTER) MISC Change every 3 months. 1 each 3     glucagon 1 MG kit Inject 1 mg Subcutaneous once as needed for low blood sugar (Use as directed by provider) 1 each 0     glucose (BD GLUCOSE) 4 g chewable tablet Take 4 tablets by mouth every 15 minutes as needed for low blood sugar 50 tablet 0     glucose 40  % (400 mg/mL) gel 15 g every 15 minutes by mouth as needed for low blood sugar. Oral gel is preferable for conscious and able to swallow patient. 112.5 g 0     insulin aspart (NOVOLOG PEN) 100 UNIT/ML pen Three times daily with meals. DOSE: 1 units per 10 grams of carbohydrate.  Only chart total amount of units given.  Do not give if pre-prandial glucose is less than 60 mg/dL. If given at mealtime, administer within 30 minutes of start of meal. 15 mL 1     insulin aspart (NOVOLOG PEN) 100 UNIT/ML pen With snacks or supplements. DOSE:  1 units per 10 grams of carbohydrate. Only chart total amount of units given.  Do not give if pre-prandial glucose is less than 60 mg/dL. If given at mealtime, administer within 30 minutes of start of meal. 15 mL 1     insulin aspart (NOVOLOG PEN) 100 UNIT/ML pen Inject 1-10 Units Subcutaneous 3 times daily (before meals) Correction Scale - HIGH INSULIN RESISTANCE DOSING   Do Not give Correction Insulin if Pre-Meal BG less than 140. For Pre-Meal  - 164 give 1 unit. For Pre-Meal  - 189 give 2 units. For Pre-Meal  - 214 give 3 units. For Pre-Meal  - 239 give 4 units. For Pre-Meal  - 264 give 5 units. For Pre-Meal  - 289 give 6 units. For Pre-Meal  - 314 give 7 units. For Pre-Meal  - 339 give 8 units. For Pre-Meal  - 364 give 9 units.  For Pre-Meal BG greater than or equal to 365 give 10 units To be given with prandial insulin, and based on pre-meal blood glucose. Notify provider if glucose greater than or equal to 350 mg/dL after administration of correction dose. If given at mealtime, administer within 30 minutes of start of meal. 15 mL 1     insulin aspart (NOVOLOG PEN) 100 UNIT/ML pen Inject 1-7 Units Subcutaneous At Bedtime HIGH INSULIN RESISTANCE DOSING  Do Not give Bedtime Correction Insulin if BG less than 200. For  - 224 give 1 units. For  - 249 give 2 units. For  - 274 give 3 units. For  - 299 give 4  units. For  - 324 give 5 units. For  - 349 give 6 units. For BG greater than or equal to 350 give 7 units. Notify provider if glucose greater than or equal to 350 mg/dL after administration of correction dose. If given at mealtime, administer within 30 minutes of start of meal. 15 mL 1     Insulin Glargine-yfgn (SEMGLEE, YFGN,) 100 UNIT/ML SOPN Inject 26 Units Subcutaneous At Bedtime 15 mL 1     insulin pen needle (32G X 4 MM) 32G X 4 MM miscellaneous Use as directed by provider. Per insurance coverage: per pt- TRUEPLUS pen needle 100 each 0     multivitamin w/minerals (THERA-VIT-M) tablet Take 1 tablet by mouth daily       Sharps Container MISC Use as directed to dispose of needles, lancets and other sharps. Per Insurance coverage 1 each 0     Family History  family history includes C.A.D. in his paternal grandfather; Prostate Cancer in his paternal grandfather.   Father and mother are healthy    Social History  Social History     Tobacco Use     Smoking status: Never Smoker     Smokeless tobacco: Never Used   Substance Use Topics     Alcohol use: Not on file     Drug use: Not on file   drink alcohol socially -- usually beer  No smoking  No drug  Work --    Single, no children    ROS  10 points ROS were negative otherwise mentioned in HPI    Physical Exam  Limited due to virtual visit  Constitutional: no distress, comfortable, pleasant   Eyes: anicteric  Psychological: appropriate mood       RESULTS  I have personally reviewed labs and images. I also reviewed labs with patient and discussed the result and plan of care.  ENDO DIABETES Latest Ref Rng & Units 1/18/2022   A1C <=5.6 % 11.6 (H)   ALT 0 - 70 U/L 26   AST 0 - 45 U/L 17   CREATININE 0.66 - 1.25 mg/dL 1.42 (H)     ASSESSMENT:    Louie is a 26 year old male previously healthy presents today for newly diagnosed type 1 diabetes    1) newly diagnosed type 1 diabetes: after presented with 20 lbs weight loss, increased urination and  fatigue and found to have DKA. He is quite knowledgeble about type 1 diabetes and management including carb counting, hypoglycemic rescue.  - I believe he is trending toward Honeymoon period  - Reduce Glargine (Semglee) -- 15 units at night  - Novolog 1 unit per 15-20 gram carb with meal  - discuss insulin pump for future use -- he is interested in it. refer to CDE    2) DM complications:  Retinopathy: no blurred vision, has not had eye exam  Nephropathy: will test once his glucose is stable at the next visit  Neuropathy: none, will test at the next in-person visit    PLAN:   Reduce Glargine (Semglee) -- 15 units at night  Novolog 1 unit per 15-20 gram carb with meal  Novolog correction scale  Before meals---  For Pre-Meal  - 164 give 1 unit.   For Pre-Meal  - 189 give 2 units.   For Pre-Meal  - 214 give 3 units.   For Pre-Meal  - 239 give 4 units.   For Pre-Meal  - 264 give 5 units.   For Pre-Meal  - 289 give 6 units.   For Pre-Meal  - 314 give 7 units.   For Pre-Meal  - 339 give 8 units.   For Pre-Meal  - 364 give 9 units.   For Pre-Meal BG greater than or equal to 365 give 10 units     Before bedtime---  For  - 224 give 1 units.   For  - 249 give 2 units.   For  - 274 give 3 units.   For  - 299 give 4 units.   For  - 324 give 5 units.   For  - 349 give 6 units.   For BG greater than or equal to 350 give 7 units.     Refer CDE for pump inititation  Return in 4 months    Start: 02/25/2022 08:10 am  Stop: 02/25/2022 08:35 am  VDO duration 25 minutes    External notes/medical records independently reviewed, labs and imaging independently reviewed, medical management and tests to be discussed/communicated to patient.    Time: I spent  42 minutes spent on the date of the encounter preparing to see patient (including chart review and preparation), obtaining and or reviewing additional medical history, performing a physical exam and  evaluation, documenting clinical information in the electronic health record, independently interpreting results, communicating results to the patient and coordinating care.    Laurence Menendez MD  Division of Diabetes and Endocrinology  Department of Medicine  [phone]

## 2022-02-25 ENCOUNTER — VIRTUAL VISIT (OUTPATIENT)
Dept: ENDOCRINOLOGY | Facility: CLINIC | Age: 27
End: 2022-02-25
Payer: COMMERCIAL

## 2022-02-25 DIAGNOSIS — E10.65 TYPE 1 DIABETES MELLITUS WITH HYPERGLYCEMIA (H): Primary | ICD-10-CM

## 2022-02-25 PROCEDURE — 99215 OFFICE O/P EST HI 40 MIN: CPT | Mod: 95 | Performed by: INTERNAL MEDICINE

## 2022-02-25 NOTE — PATIENT INSTRUCTIONS
-Reduce Glargine (Semglee) -- 15 units at night  -Novolog 1 unit per 15-20 gram carb with meal    Refer diabetic educator    If you have any questions, please do not hesitate to call clinic line at [phone] and ask for Endocrinology clinic.  If you need to fax, please fax to clinic fax number at [phone]    After clinic hours or weekends, please contact [phone] and ask for Endocrinologist-on call      Sincerely,    Laurence Menendez MD  Endocrinology

## 2022-02-25 NOTE — LETTER
2/25/2022       RE: Louie Alicia  [address]  [place] [address]     Dear Colleague,    Thank you for referring your patient, Louie Alicia, to the [facility] [place] [facility]. Please see a copy of my visit note below.    Outcome for 02/21/22 3:32 PM: Nosto message sent   Leonieleonora Martinez on 2/21/2022 at 3:32 PM  Outcome for 02/22/22 1:03 PM: Data emailed to provider   Leonie Martinez on 2/22/2022 at 1:03 PM               Endocrinology Note         Louie is a 26 year old male presents today for newly diagnosed type 1 diabetes    HPI  Louie is a 26 year old male previously healthy presents today for newly diagnosed type 1 diabetes    He was recently admitted for newly diagnose type 1 diabetes after he was found to have random glucose of 700s. He was initially went to see the doctor after noticing significant weight loss, increased fatigue and increased urination. He found to have DKA and was started IVF and insulin drip. A1c was 11.6, PALOMO Ab was positive >250. TSH 2.64. He was subsequently transitioned to subcutaneous insulin later. He met with CDE during hospitalization and feels confident with carbohydrate counting. In retrospect, he reports 2 months of 20 lbs weight loss. His usual baseline weight was 175 lbs. Weight during admission was 159 lbs. Two weeks prior to admission, he noticed increased fatigue and urination. He denied blurred vision, SOB, chest pain, palpitation, abdominal pain, nausea, vomiting, skin changes or hair loss. His sleep is good.     Interim history  He said that overall he is doing well. He notices more low blood glucose without feeling it.     Using Dexcom in the past month reviewed      Current medication regimen  Glargine (Semglee) -- 18 units at night  Novolog 1 unit per 15-20 gram carb with meal  Novolog correction scale  Before meals---  For Pre-Meal  - 164 give 1 unit.    For Pre-Meal  - 189 give 2 units.   For Pre-Meal  - 214 give 3 units.   For Pre-Meal  - 239 give 4 units.   For Pre-Meal  - 264 give 5 units.   For Pre-Meal  - 289 give 6 units.   For Pre-Meal  - 314 give 7 units.   For Pre-Meal  - 339 give 8 units.   For Pre-Meal  - 364 give 9 units.   For Pre-Meal BG greater than or equal to 365 give 10 units     Before bedtime---  For  - 224 give 1 units.   For  - 249 give 2 units.   For  - 274 give 3 units.   For  - 299 give 4 units.   For  - 324 give 5 units.   For  - 349 give 6 units.   For BG greater than or equal to 350 give 7 units.       Exercise -- 20-30 minutes -- Peloton bike about 2-3 times per week  Denied family hx of type 1 or type 2 diabetes or autoimmune diseases    Past Medical History  Recently diagnosed type 1 diabetes    Allergies  Allergies   Allergen Reactions     Amoxicillin      1/18/21: Pt does not know what his reaction was (occurred as a child)     Cefprozil      1/18/21: Pt does not know what his reaction was (occurred as a child)       Medications  Current Outpatient Medications   Medication Sig Dispense Refill     Alcohol Swabs (SM ALCOHOL PREP) 70 % PADS Apply 1 swab topically See Admin Instructions with injection or lancet 10 times per day or as directed. 300 each 11     blood glucose (NO BRAND SPECIFIED) lancets standard To use to test glucose level in the blood. Use to test blood sugar  4  times daily as directed. To accompany glucose monitor brands per insurance coverage. 100 each 0     blood glucose (NO BRAND SPECIFIED) test strip To use to test glucose level in the blood. Use to test blood sugar 4 times daily as directed. To accompany glucose monitor brands per insurance coverage. 100 strip 11     blood glucose monitoring (NO BRAND SPECIFIED) meter device kit Use as directed. Per insurance coverage 1 kit 0     Continuous Blood Gluc  (DEXCOM G6  ) VIRI Use to read blood sugars as per 's instructions. 1 each 0     Continuous Blood Gluc Sensor (DEXCOM G6 SENSOR) MISC Change every 10 days. 3 each 11     Continuous Blood Gluc Transmit (DEXCOM G6 TRANSMITTER) MISC Change every 3 months. 1 each 3     glucagon 1 MG kit Inject 1 mg Subcutaneous once as needed for low blood sugar (Use as directed by provider) 1 each 0     glucose (BD GLUCOSE) 4 g chewable tablet Take 4 tablets by mouth every 15 minutes as needed for low blood sugar 50 tablet 0     glucose 40 % (400 mg/mL) gel 15 g every 15 minutes by mouth as needed for low blood sugar. Oral gel is preferable for conscious and able to swallow patient. 112.5 g 0     insulin aspart (NOVOLOG PEN) 100 UNIT/ML pen Three times daily with meals. DOSE: 1 units per 10 grams of carbohydrate.  Only chart total amount of units given.  Do not give if pre-prandial glucose is less than 60 mg/dL. If given at mealtime, administer within 30 minutes of start of meal. 15 mL 1     insulin aspart (NOVOLOG PEN) 100 UNIT/ML pen With snacks or supplements. DOSE:  1 units per 10 grams of carbohydrate. Only chart total amount of units given.  Do not give if pre-prandial glucose is less than 60 mg/dL. If given at mealtime, administer within 30 minutes of start of meal. 15 mL 1     insulin aspart (NOVOLOG PEN) 100 UNIT/ML pen Inject 1-10 Units Subcutaneous 3 times daily (before meals) Correction Scale - HIGH INSULIN RESISTANCE DOSING   Do Not give Correction Insulin if Pre-Meal BG less than 140. For Pre-Meal  - 164 give 1 unit. For Pre-Meal  - 189 give 2 units. For Pre-Meal  - 214 give 3 units. For Pre-Meal  - 239 give 4 units. For Pre-Meal  - 264 give 5 units. For Pre-Meal  - 289 give 6 units. For Pre-Meal  - 314 give 7 units. For Pre-Meal  - 339 give 8 units. For Pre-Meal  - 364 give 9 units.  For Pre-Meal BG greater than or equal to 365 give 10 units To be given with  prandial insulin, and based on pre-meal blood glucose. Notify provider if glucose greater than or equal to 350 mg/dL after administration of correction dose. If given at mealtime, administer within 30 minutes of start of meal. 15 mL 1     insulin aspart (NOVOLOG PEN) 100 UNIT/ML pen Inject 1-7 Units Subcutaneous At Bedtime HIGH INSULIN RESISTANCE DOSING  Do Not give Bedtime Correction Insulin if BG less than 200. For  - 224 give 1 units. For  - 249 give 2 units. For  - 274 give 3 units. For  - 299 give 4 units. For  - 324 give 5 units. For  - 349 give 6 units. For BG greater than or equal to 350 give 7 units. Notify provider if glucose greater than or equal to 350 mg/dL after administration of correction dose. If given at mealtime, administer within 30 minutes of start of meal. 15 mL 1     Insulin Glargine-yfgn (SEMGLEE, YFGN,) 100 UNIT/ML SOPN Inject 26 Units Subcutaneous At Bedtime 15 mL 1     insulin pen needle (32G X 4 MM) 32G X 4 MM miscellaneous Use as directed by provider. Per insurance coverage: per pt- TRUEPLUS pen needle 100 each 0     multivitamin w/minerals (THERA-VIT-M) tablet Take 1 tablet by mouth daily       Sharps Container MISC Use as directed to dispose of needles, lancets and other sharps. Per Insurance coverage 1 each 0     Family History  family history includes C.A.D. in his paternal grandfather; Prostate Cancer in his paternal grandfather.   Father and mother are healthy    Social History  Social History     Tobacco Use     Smoking status: Never Smoker     Smokeless tobacco: Never Used   Substance Use Topics     Alcohol use: Not on file     Drug use: Not on file   drink alcohol socially -- usually beer  No smoking  No drug  Work --    Single, no children    ROS  10 points ROS were negative otherwise mentioned in HPI    Physical Exam  Limited due to virtual visit  Constitutional: no distress, comfortable, pleasant   Eyes:  anicteric  Psychological: appropriate mood       RESULTS  I have personally reviewed labs and images. I also reviewed labs with patient and discussed the result and plan of care.  ENDO DIABETES Latest Ref Rng & Units 1/18/2022   A1C <=5.6 % 11.6 (H)   ALT 0 - 70 U/L 26   AST 0 - 45 U/L 17   CREATININE 0.66 - 1.25 mg/dL 1.42 (H)     ASSESSMENT:    Louie is a 26 year old male previously healthy presents today for newly diagnosed type 1 diabetes    1) newly diagnosed type 1 diabetes: after presented with 20 lbs weight loss, increased urination and fatigue and found to have DKA. He is quite knowledgeble about type 1 diabetes and management including carb counting, hypoglycemic rescue.  - I believe he is trending toward Honeymoon period  - Reduce Glargine (Semglee) -- 15 units at night  - Novolog 1 unit per 15-20 gram carb with meal  - discuss insulin pump for future use -- he is interested in it. refer to CDE    2) DM complications:  Retinopathy: no blurred vision, has not had eye exam  Nephropathy: will test once his glucose is stable at the next visit  Neuropathy: none, will test at the next in-person visit    PLAN:   Reduce Glargine (Semglee) -- 15 units at night  Novolog 1 unit per 15-20 gram carb with meal  Novolog correction scale  Before meals---  For Pre-Meal  - 164 give 1 unit.   For Pre-Meal  - 189 give 2 units.   For Pre-Meal  - 214 give 3 units.   For Pre-Meal  - 239 give 4 units.   For Pre-Meal  - 264 give 5 units.   For Pre-Meal  - 289 give 6 units.   For Pre-Meal  - 314 give 7 units.   For Pre-Meal  - 339 give 8 units.   For Pre-Meal  - 364 give 9 units.   For Pre-Meal BG greater than or equal to 365 give 10 units     Before bedtime---  For  - 224 give 1 units.   For  - 249 give 2 units.   For  - 274 give 3 units.   For  - 299 give 4 units.   For  - 324 give 5 units.   For  - 349 give 6 units.   For BG greater than  or equal to 350 give 7 units.     Refer CDE for pump inititation  Return in 4 months    Start: 02/25/2022 08:10 am  Stop: 02/25/2022 08:35 am  VDO duration 25 minutes    External notes/medical records independently reviewed, labs and imaging independently reviewed, medical management and tests to be discussed/communicated to patient.    Time: I spent  42 minutes spent on the date of the encounter preparing to see patient (including chart review and preparation), obtaining and or reviewing additional medical history, performing a physical exam and evaluation, documenting clinical information in the electronic health record, independently interpreting results, communicating results to the patient and coordinating care.    Laurence Menendez MD  Division of Diabetes and Endocrinology  Department of Medicine  [phone]

## 2022-02-25 NOTE — PROGRESS NOTES
Tito is a 26 year old who is being evaluated via a billable video visit.      Patient denies any changes since echeck-in regarding medication and allergies and states all information entered during echeck-in remains accurate.    Paula Patterson    How would you like to obtain your AVS? MyChart  If the video visit is dropped, the invitation should be resent by: Text to cell phone: [phone]  Will anyone else be joining your video visit? No

## 2022-03-01 ENCOUNTER — TELEPHONE (OUTPATIENT)
Dept: ENDOCRINOLOGY | Facility: CLINIC | Age: 27
End: 2022-03-01
Payer: COMMERCIAL

## 2022-03-01 NOTE — TELEPHONE ENCOUNTER
I called Tito to schedule a 3 month follow up but it looks like schedule is booked out until August. I scheduled an appt 8/12 and added to the wait list. Will this appointment time be ok or do you have somewhere you'd like to add him sooner or to see another provider? Please send message to scheduling for them to follow up with patient if needed

## 2022-03-05 ENCOUNTER — HEALTH MAINTENANCE LETTER (OUTPATIENT)
Age: 27
End: 2022-03-05

## 2022-03-16 DIAGNOSIS — E10.10 DIABETIC KETOACIDOSIS WITHOUT COMA ASSOCIATED WITH TYPE 1 DIABETES MELLITUS (H): ICD-10-CM

## 2022-03-17 RX ORDER — PEN NEEDLE, DIABETIC 32GX 5/32"
NEEDLE, DISPOSABLE MISCELLANEOUS
Qty: 450 EACH | Refills: 3 | Status: SHIPPED | OUTPATIENT
Start: 2022-03-17 | End: 2023-06-13

## 2022-03-17 NOTE — TELEPHONE ENCOUNTER
Pen needles    2/25/2022  [facility] [place]     Laurence Menendez MD    Endocrinology, Diabetes, and Metabolism       Routed because: not sure qty.?

## 2022-03-29 ENCOUNTER — VIRTUAL VISIT (OUTPATIENT)
Dept: EDUCATION SERVICES | Facility: CLINIC | Age: 27
End: 2022-03-29
Attending: INTERNAL MEDICINE
Payer: COMMERCIAL

## 2022-03-29 DIAGNOSIS — E10.65 TYPE 1 DIABETES MELLITUS WITH HYPERGLYCEMIA (H): ICD-10-CM

## 2022-03-29 DIAGNOSIS — E10.10 DIABETIC KETOACIDOSIS WITHOUT COMA ASSOCIATED WITH TYPE 1 DIABETES MELLITUS (H): ICD-10-CM

## 2022-03-29 PROCEDURE — G0108 DIAB MANAGE TRN  PER INDIV: HCPCS | Mod: 95 | Performed by: REGISTERED NURSE

## 2022-03-29 RX ORDER — URINE ACETONE TEST STRIPS
STRIP MISCELLANEOUS
Qty: 50 STRIP | Refills: 3 | Status: SHIPPED | OUTPATIENT
Start: 2022-03-29 | End: 2023-08-28

## 2022-03-29 RX ORDER — INSULIN GLARGINE-YFGN 100 [IU]/ML
15 INJECTION, SOLUTION SUBCUTANEOUS AT BEDTIME
Qty: 15 ML | Refills: 1 | Status: SHIPPED | OUTPATIENT
Start: 2022-03-29 | End: 2022-06-02

## 2022-03-29 NOTE — PROGRESS NOTES
Video-Visit Details    Type of service:  Video Visit  Patient consented to video visit  Video Start Time:  0800  Video End Time:   0904  Originating Location (pt. Location): Home  Distant Location (provider location):   [employer] [place] DIABETES EDUCATION [place]   Platform used for Video Visit: [employer]     Diabetes Self-Management Education & Support    Louie Alicia presents today for education related to Type 1 diabetes    Patient is being treated with:  MDI insulin  He is accompanied by self    Year of diagnosis: January 2022  Referring provider:  Laurence Menendez MD  Living Situation: Lives with one room mate.    Employment: Has a degree in accounting, works as a  for a bank.     PATIENT CONCERNS RELATED TO DIABETES SELF MANAGEMENT:  He's only had diabetes for about 2-3 months.  He has not had a visit with a diabetes educator since diagnosis outside of a brief visit in the hospital.  He states that several health care professionals have suggested to him that he needs to get an insulin pump and he'd like to learn more about this option.     ASSESSMENT:    Taking Medication:     Current Diabetes Management per Patient:  Takes 15 units of long acting insulin (medication list states taking 26 units)  Novolog insulin:  1 unit per 15 grams CHO with a correction scale of 1/40/140   Monitoring    Patient glucose self monitoring as follows: continuously using a continuous glucose monitor (CGM)  BG meter: Dexcom   Report below:                    Patient's most recent   Lab Results   Component Value Date    A1C 11.6 01/18/2022      Patient's A1C goal: <7.0    Activity:   Stationery bike for 45-60 minutes early in the morning.  Notes that his glucose rises after this activity.     Healthy Eating:   Patient currently eats 3 meals rare snacks per day.  He feels like he has a pretty good handle on carb counting but finds this challenging when he eats out.  He is currently eating sort of the same  "thing every day, to total about 60 grams per day, just to make it easier for him to calculate his insulin dosing.      Problem Solving:      Patient is at risk of hypoglycemia?: YES, Frequency is one to two times per week, Feels symptoms when glucose is between 60 and 70 and Usual treatment is 15 grams of Lifesavers or Werthers candy  Hospitalizations for hyper or hypoglycemia: Yes (Please explain): DKA at the time of diagnosis.     Healthy Coping and Stress Management:     He is still adjusting to having a chronic condition to manage.    He is a \"numbers ernst\" and doesn't find doing insulin calculations difficult at all.     EDUCATION and INSTRUCTION PROVIDED AT THIS VISIT:       Reviewed his overall management of diabetes.  He was in DKA with glucoses in the 700's at the time of diagnosis.    He states that he is doing OK with his Dexcom, but is still getting used to wearing a device and isn't sure about wearing another device.     Reviewed some of the criteria set for wearing an insulin pump:  Accurate carb counting, ability to problem solve, attention to glucose control and willingness to engage with his diabetes team and follow up as recommended with both diabetes education as well as endocrinology.  Discussed in general terms how insulin pumps work.    Explained how hybrid closed loop systems work.      Reviewed how he is currently managing hypoglycemia and hyperglycemia.  His only experience with DKA was at the time of diagnosis.  He is generally managing hypoglycemia appropriately.  He recognizes that glucoses over 250 mg/dL are \"too high\", and will administer a correction dose after meals, if he feels that the hyperglycemia is a result of miscounting carbohydrates.  Discussed how to manage glucoses over 250 if not returning to baseline. Prescription for ketone strips sent to [facility].      Discussed reliable sources of information and sent  e-copy of JDRF's toolkit for newly diagnosed " people with Type 1 diabetes.     At this point, Tito is not inclined to move forward with getting an insulin pump.  Feels that he needs a little more time to get used to the diagnosis and to learn how to manage MDI dosing.  He states that he isn't sure about having another thing attached to his body and feels like he is doing well with his current insulin regimen.      Patient-stated goal written and given to Louie Alicia.  Verbalized and demonstrated understanding of instructions.     PLAN:  See patient instructions  AVS printed and given to patient    FOLLOW-UP:        Time spent with patient at today's visit was 60 minutes.      Any diabetes medication dose changes were made via the CDE Protocol and Collaborative Practice Agreement with [place] and  [facility].  A copy of this encounter was provided to patient's referring provider.

## 2022-04-07 ENCOUNTER — VIRTUAL VISIT (OUTPATIENT)
Dept: EDUCATION SERVICES | Facility: CLINIC | Age: 27
End: 2022-04-07
Payer: COMMERCIAL

## 2022-04-07 DIAGNOSIS — E10.9 TYPE 1 DIABETES MELLITUS (H): Primary | ICD-10-CM

## 2022-04-07 PROCEDURE — 97803 MED NUTRITION INDIV SUBSEQ: CPT | Mod: 95 | Performed by: NUTRITIONIST

## 2022-04-08 NOTE — PROGRESS NOTES
Tito is a 27 year old who is being evaluated via a billable video visit.      How would you like to obtain your AVS? MyChart  If the video visit is dropped, the invitation should be resent by: Send to e-mail at: [email or web address]  Will anyone else be joining your video visit? No      Video Start Time: 7:30am    Video-Visit Details    Type of service:  Video Visit    Video End Time:8:15am    Originating Location (pt. Location): Home    Distant Location (provider location):  [email or web address] [place]     Platform used for Video Visit: [employer]    Diabetes Self-Management Education & Support    Presents for:  Type 1 diabetes, new diagnosis  Referred by Dr. Laurence Menendez    SUBJECTIVE/OBJECTIVE:     Cultural Influences/Ethnic Background:  Not  or    No concerns today    Diabetes Symptoms & Complications:          Patient Problem List and Family Medical History reviewed for relevant medical history, current medical status, and diabetes risk factors.    Vitals:  There were no vitals taken for this visit.  Estimated body mass index is 21.59 kg/m  as calculated from the following:    Height as of 1/18/22: 1.829 m (6').    Weight as of 1/18/22: 72.2 kg (159 lb 2.8 oz).   Last 3 BP:   BP Readings from Last 3 Encounters:   01/20/22 118/79   01/18/22 125/72   06/19/14 149/67       History   Smoking Status     Never Smoker   Smokeless Tobacco     Never Used       Labs:  Lab Results   Component Value Date    A1C 11.6 01/18/2022     Lab Results   Component Value Date     01/20/2022     01/20/2022     No results found for: LDL  No results found for: HDL]  GFR Estimate   Date Value Ref Range Status   01/20/2022 >90 >60 mL/min/1.73m2 Final     Comment:     Effective December 21, 2021 eGFRcr in adults is calculated using the 2021 CKD-EPI creatinine equation which includes age and gender (Anna morris al., NEJM, DOI: 10.1056/FQNIbm3712646)     No results found for:  GFRESTNURYSACK  Lab Results   Component Value Date    CR 0.71 01/20/2022     No results found for: MICROALBUMIN    Healthy Eating:  Seems to be doing well with carb counting  Using labels, jolene quintanilla, my fitness pal  Mom is making some meals and she will also carb count  He is tending to eat about 60 grams of carbs at meals to keep it simple  Diet is generally healthy and well balanced  Nuts for snacks sometimes  Light beer on weekends 5-15 over fri-sun  He has not experienced lows with drinking, keeps an eye on his glucose, doesn't drink on an empty stomach, checks glucose before bed      Monitoring:       Patient is taking 15 units glargine and novolog 1:15-20 grams of carbs and correction 1/40/140/200 but hasn't needed to use correction due to glucose control  He understands how to treat hypo  Has had dm education while in hospital and with outpatient RN ARNOLDOE    Taking Medications:  Diabetes Medication(s)     Diabetic Other       glucagon 1 MG kit    Inject 1 mg Subcutaneous once as needed for low blood sugar (Use as directed by provider)     glucose (BD GLUCOSE) 4 g chewable tablet    Take 4 tablets by mouth every 15 minutes as needed for low blood sugar     glucose 40 % (400 mg/mL) gel    15 g every 15 minutes by mouth as needed for low blood sugar. Oral gel is preferable for conscious and able to swallow patient.    Insulin       insulin aspart (NOVOLOG PEN) 100 UNIT/ML pen    Three times daily with meals. DOSE: 1 units per 10 grams of carbohydrate.  Only chart total amount of units given.  Do not give if pre-prandial glucose is less than 60 mg/dL. If given at mealtime, administer within 30 minutes of start of meal.     insulin aspart (NOVOLOG PEN) 100 UNIT/ML pen    With snacks or supplements. DOSE:  1 units per 10 grams of carbohydrate. Only chart total amount of units given.  Do not give if pre-prandial glucose is less than 60 mg/dL. If given at mealtime, administer within 30 minutes of start of meal.      insulin aspart (NOVOLOG PEN) 100 UNIT/ML pen    Inject 1-10 Units Subcutaneous 3 times daily (before meals) Correction Scale - HIGH INSULIN RESISTANCE DOSING   Do Not give Correction Insulin if Pre-Meal BG less than 140. For Pre-Meal  - 164 give 1 unit. For Pre-Meal  - 189 give 2 units. For Pre-Meal  - 214 give 3 units. For Pre-Meal  - 239 give 4 units. For Pre-Meal  - 264 give 5 units. For Pre-Meal  - 289 give 6 units. For Pre-Meal  - 314 give 7 units. For Pre-Meal  - 339 give 8 units. For Pre-Meal  - 364 give 9 units.  For Pre-Meal BG greater than or equal to 365 give 10 units To be given with prandial insulin, and based on pre-meal blood glucose. Notify provider if glucose greater than or equal to 350 mg/dL after administration of correction dose. If given at mealtime, administer within 30 minutes of start of meal.     insulin aspart (NOVOLOG PEN) 100 UNIT/ML pen    Inject 1-7 Units Subcutaneous At Bedtime HIGH INSULIN RESISTANCE DOSING  Do Not give Bedtime Correction Insulin if BG less than 200. For  - 224 give 1 units. For  - 249 give 2 units. For  - 274 give 3 units. For  - 299 give 4 units. For  - 324 give 5 units. For  - 349 give 6 units. For BG greater than or equal to 350 give 7 units. Notify provider if glucose greater than or equal to 350 mg/dL after administration of correction dose. If given at mealtime, administer within 30 minutes of start of meal.     Insulin Glargine-yfgn (SEMGLEE, YFGN,) 100 UNIT/ML SOPN    Inject 15 Units Subcutaneous At Bedtime          Healthy Coping:     Patient Activation Measure Survey Score:  No flowsheet data found.    Diabetes knowledge and skills assessment:   Patient is knowledgeable in diabetes management concepts related to: Healthy Eating    Patient needs further education on the following diabetes management concepts: Healthy Eating    Based on learning assessment above, most  appropriate setting for further diabetes education would be: Individual setting.      INTERVENTIONS:    Education provided today on:  AADE Self-Care Behaviors:  Healthy Eating: carbohydrate counting, consistency in amount, composition, and timing of food intake and label reading    Opportunities for ongoing education and support in diabetes-self management were discussed.    Pt verbalized understanding of concepts discussed and recommendations provided today.       Education Materials Provided:  No new materials provided today      ASSESSMENT:    Patient's most recent   Lab Results   Component Value Date    A1C 11.6 01/18/2022    is not meeting goal of <7.0    PLAN  No changes to current plan  Time Spent: 45 minutes  Encounter Type: Individual    Any diabetes medication dose changes were made via the CDE Protocol and Collaborative Practice Agreement with the patient's referring provider. A copy of this encounter was shared with the provider.

## 2022-04-30 ENCOUNTER — HEALTH MAINTENANCE LETTER (OUTPATIENT)
Age: 27
End: 2022-04-30

## 2022-05-05 ENCOUNTER — VIRTUAL VISIT (OUTPATIENT)
Dept: EDUCATION SERVICES | Facility: CLINIC | Age: 27
End: 2022-05-05
Payer: COMMERCIAL

## 2022-05-05 DIAGNOSIS — E10.9 TYPE 1 DIABETES MELLITUS (H): Primary | ICD-10-CM

## 2022-05-05 PROCEDURE — G0108 DIAB MANAGE TRN  PER INDIV: HCPCS | Mod: 95 | Performed by: REGISTERED NURSE

## 2022-05-05 RX ORDER — INSULIN PEN,REUSABLE,BT LISPRO
1 INSULIN PEN (EA) SUBCUTANEOUS CONTINUOUS
Qty: 1 EACH | Refills: 0 | Status: SHIPPED | OUTPATIENT
Start: 2022-05-05 | End: 2024-06-28

## 2022-05-05 NOTE — PROGRESS NOTES
Video-Visit Details    Type of service:  Video Visit  Patient consented to video visit  Video Start Time:  0800  Video End Time:   0845  Originating Location (pt. Location): Home  Distant Location (provider location):   [employer] [place] DIABETES EDUCATION [place]   Platform used for Video Visit: [employer]     Diabetes Self-Management Education & Support    Louie Alicia presents today for education related to Type 1 diabetes    Patient is being treated with:  MDI insulin  He is accompanied by self    Year of diagnosis: January 2022  Referring provider:  Laurence Menendez MD  Living Situation: Lives with one room mate.    Employment: Has a degree in accounting, works as a  for a bank.     PATIENT CONCERNS RELATED TO DIABETES SELF MANAGEMENT:  He's only had diabetes for about 4 months.  He is doing well, however having low glucose throughout the day.  Has some questions about managing exercise.     ASSESSMENT:    Taking Medication:     Current Diabetes Management per Patient:  Takes 15 units of long acting insulin   Novolog insulin:  1 unit per 25 grams CHO with a correction scale of 1/40/140   Monitoring    Patient glucose self monitoring as follows: continuously using a continuous glucose monitor (CGM)  BG meter: Dexcom   Report below:                    Patient's most recent   Lab Results   Component Value Date    A1C 11.6 01/18/2022      Patient's A1C goal: <7.0    Activity:   Started golf season again.  He plays Fridays after work (9 hole), and Saturdays and Sundays (18 holes).  He is wondering what is the best strategy for managing this.      Healthy Eating:   Patient currently eats 3 meals rare snacks per day.  He feels like he has a pretty good handle on carb counting but finds this challenging when he eats out.  He is currently eating sort of the same thing every day, to total about 60 grams per day, just to make it easier for him to calculate his insulin dosing.  He has backed off on  "how much he is covering, however.      Problem Solving:      Patient is at risk of hypoglycemia?: YES, Frequency is one to two times per week, Feels symptoms when glucose is between 60 and 70 and Usual treatment is 15 grams of Lifesavers or Werthers candy  Hospitalizations for hyper or hypoglycemia: Yes (Please explain): DKA at the time of diagnosis.     Healthy Coping and Stress Management:     He is still adjusting to having a chronic condition to manage.    He is a \"numbers ernst\" and doesn't find doing insulin calculations difficult at all.       EDUCATION and INSTRUCTION PROVIDED AT THIS VISIT:       Reviewed his Dexcom sensor report:  He is having way too much hypoglycemia and feels like he is fighting low glucose all day.  Discussed how to look at patterns to determine which insulin may need to be adjusted.  Clearly he is over-basalized at this time.  Advised him to drop his Semglee dose from 15 units to 13 units, wait 3-4 days and if hypoglycemia continues throughout the day, drop the dose by 1 unit every 3-4 days until fasting glucoses are no longer < 80.  Discussed that he will likely need to increase the amount he is covering food with a change in the LA insulin.  Currently he is using an insulin to carb ratio of 1:25, but often he is just skipping Novolog coverage with food out of concern for glucose dropping.      Discussed managing his activity, which includes golfing.  Had a general discussion about which activities are most closely associated with hypoglycemia.  Suggested that on Fridays he go ahead and give himself his usual dose of Novolog to cover lunch, but that he skip coverage of the meal he eats immediately before teeing off.  On Satudays and Sundays, suggest that he half his coverage for the meal prior to teeing off.  All of this will most likely change once we get his LA and RA insulin balanced out.      Did calculations based on current TTD, and revised his correction scale and ICR in the med " list.   LA insulin dose:  13 units  ICR:  1 unit per 25 grams CHO  ISF:  1 unit per 75 mg/dL over target of 150.     Discussed with Tito that he could benefit from a half unit pen and suggested that he consider using the In-Pen device to more precisely calculate his Novolog insulin.  Demonstrated how the cartridge works with the pen, and discussed how the phone philippe works.  He was interested in this, and OK'd ordering it.  Will order a [place] pen from [facility].  Once coverage is determined will order the insulin cartridges from his local pharmacy.  He was in agreement with this plan.       Patient-stated goal written and given to Louie Alicia.  Verbalized and demonstrated understanding of instructions.     PLAN:  See patient instructions  AVS printed and given to patient    FOLLOW-UP:    Appointment scheduled in one month.     Time spent with patient at today's visit was 48 minutes.      Any diabetes medication dose changes were made via the CDE Protocol and Collaborative Practice Agreement with [place] and ELLYN [facility].  A copy of this encounter was provided to patient's referring provider.

## 2022-05-17 DIAGNOSIS — E10.10 DIABETIC KETOACIDOSIS WITHOUT COMA ASSOCIATED WITH TYPE 1 DIABETES MELLITUS (H): ICD-10-CM

## 2022-05-23 DIAGNOSIS — E10.10 DIABETIC KETOACIDOSIS WITHOUT COMA ASSOCIATED WITH TYPE 1 DIABETES MELLITUS (H): ICD-10-CM

## 2022-06-02 ENCOUNTER — VIRTUAL VISIT (OUTPATIENT)
Dept: EDUCATION SERVICES | Facility: CLINIC | Age: 27
End: 2022-06-02
Payer: COMMERCIAL

## 2022-06-02 DIAGNOSIS — E10.9 TYPE 1 DIABETES MELLITUS WITHOUT COMPLICATION (H): Primary | ICD-10-CM

## 2022-06-02 DIAGNOSIS — E10.10 DIABETIC KETOACIDOSIS WITHOUT COMA ASSOCIATED WITH TYPE 1 DIABETES MELLITUS (H): ICD-10-CM

## 2022-06-02 PROCEDURE — G0108 DIAB MANAGE TRN  PER INDIV: HCPCS | Mod: 95 | Performed by: REGISTERED NURSE

## 2022-06-02 RX ORDER — INSULIN GLARGINE-YFGN 100 [IU]/ML
9 INJECTION, SOLUTION SUBCUTANEOUS AT BEDTIME
Qty: 15 ML | Refills: 1 | Status: SHIPPED | OUTPATIENT
Start: 2022-06-02 | End: 2023-01-27

## 2022-06-02 NOTE — PATIENT INSTRUCTIONS
"Ugo Francis, nice to visit with you today.      I wanted to send you the link to the DMV Form that you need to submit re: taking insulin.  Just complete the top half, sign it, and send the form into the endocrinology office or bring it with you to the clinic for completion by Dr. Menendez.  The Endo nurses will take care of sending it into the DMV once she signs it.  You can also just email it to me at [email or web address] and I can take it from there.      https://[place]ps.[place].gov/divisions/dvs/forms-documents/Documents/DL_[place]_VoluntaryControlWaiver.pdf    Some resources for medical ID:   American Medical ID ([email or web address])  MediBand   Medic Alert   The one we discussed at your visit (one that wraps around your watch) apparently isn't available anymore.    Really, it doesn't matter which type you get as long as it identifies that you have 'insulin controlled diabetes\" or \"type 1 diabetes\" noted on it somewhere and you remember to wear it.      The company that makes storage for insulin is called [employer].  Here's a link to their website:  https://www.3ROAM.Natureâ€™s Variety/    I just talked to the [facility] in [place] and they say that the In-Pen device is covered and apparently they tried to call you but didn't get a call back.  They won't ship anything until after they've spoken with you.  Let me know when you do talk to them, so I can order the insulin cartridges and set up a time to help you get the settings into your phone philippe.  Here is the pharmacy phone number:  [phone], and here is a link to the In Pen Video:     https://www.youtube.com/watch?v=YciEuw_xyfw&t=13s&ab_channel=MedtronicDiabetes    You are doing a great job managing your diabetes--keep up the good work!      I'll be talking to you soon.  Have a great weekend!  My best, Kenyetta.     Kenyetta Valdes, MONICAN, RN, Formerly named [facility]  Certified Diabetes Care and   -Health Endocrinology and " Diabetes   [facility]  Clinic [id number]  Phone [phone]

## 2022-06-02 NOTE — PROGRESS NOTES
Video-Visit Details    Type of service:  Video Visit  Patient consented to video visit  Video Start Time:  0800  Video End Time:   0902  Originating Location (pt. Location): Home  Distant Location (provider location):   [employer] [place] DIABETES EDUCATION [place]   Platform used for Video Visit: [employer]     Diabetes Self-Management Education & Support    Louie Alicia presents today for education related to Type 1 diabetes    Patient is being treated with:  insulin  He is accompanied by self    Year of diagnosis: January 2022  Referring provider:  Laurence Menendez MD  Living Situation: Lives with one room mate.    Employment: Has a degree in accounting, works as a  for a bank.     PATIENT CONCERNS RELATED TO DIABETES SELF MANAGEMENT:  Here for ongoing education related to Type 1 diabetes.     ASSESSMENT:    Current Diabetes Management per Patient:  Semglee long acting (LA) insulin:  15 units on his med list, but in reality only taking 9 units at present  Novolog rapid acting (RA) insulin:  1 unit per 30 grams CHO.  He has a correction scale but he is not really using it, and it looks as if it is too aggressive, anyway.     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  He also does some biking, however golf is his main form of exercise.  He alternates between walking and riding the course, depending on how many holes he is playing.      Healthy Eating:  Accurate carb counting.  Tends to eat the same things every day.      Problem Solving:      Hospitalized once with DKA at the time of diagnosis, otherwise no hospitalizations related to diabetes.   He has lows rarely, but treats appropriately, and does a good job of preventing.      EDUCATION and INSTRUCTION PROVIDED AT THIS VISIT:       Medication  list updated to reflect what Tito is currently taking.    He had more questions today, mainly wondering how long he could expect his 'honeymoon' to last.    He was wondering about how much insulin he might end up taking and whether this is the same for everyone with Type 1 diabetes.  Explained that honeymoons are impossible to predict, and everyone is different in terms of how much insulin they require.      He golfs extensively on the weekends and states that during the past weekend, he did 36 holes.  He took his long acting insulin but skipped his RA insulin for the entire day and glucoses stayed in target range.  Wanted to know if this was OK (it is), and if he was doing any harm by skipping the RA insulin.  Discussed that he should never omit his long acting insulin, but the need for RA insulin varies according to the level of activity and as long as glucose is controlled, omitting his RA insulin or drastically reducing it is acceptable. He is having very little hypoglycemia, although he states that he checks frequently throughout the day, and if he notices that he is less than 90 with downward pointing arrows, he will have some candy to bring it back up.  Overall he feels like diabetes has been relatively easy to control, so far.      Discussed need to complete DMV form.  Link sent.   Discussed need for medical ID--given some resources for this.    Discussed IN-PEN device, which Dr. Menendez has ordered.  Link sent to a demonstration video, and called the pharmacy.  Apparently they attempted to call him but didn't get a response.  Asked Tito to let me know when he receives the device so we can get him set up.    Discussed how to store/cool insulin.      Patient-stated goal written and given to Louie Alicia.  Verbalized and demonstrated understanding of instructions.     PLAN:  See patient instructions  AVS printed and given to patient    FOLLOW-UP:    Will schedule appointment to set up the INPEN  when he receives it.  Still need to order the insulin cartridges for it.      Time spent with patient at today's visit was 62 minutes.      Any diabetes medication dose changes were made via the CDE Protocol and Collaborative Practice Agreement with [place] and  [facility].  A copy of this encounter was provided to patient's referring provider.

## 2022-06-03 ENCOUNTER — TELEPHONE (OUTPATIENT)
Dept: ENDOCRINOLOGY | Facility: CLINIC | Age: 27
End: 2022-06-03
Payer: COMMERCIAL

## 2022-06-03 DIAGNOSIS — E10.10 DIABETIC KETOACIDOSIS WITHOUT COMA ASSOCIATED WITH TYPE 1 DIABETES MELLITUS (H): ICD-10-CM

## 2022-06-03 NOTE — TELEPHONE ENCOUNTER
Health Call Center    Phone Message    May a detailed message be left on voicemail: no     Reason for Call: Medication Question or concern regarding medication   Prescription Clarification  Name of Medication: insulin aspart (NOVOLOG PEN) 100 UNIT/ML pen   Prescribing Provider: Dr Dang  Pharmacy: [facility] - [place], [place] - [address] (Pharmacy  What on the order needs clarification? Pharmacy wondering how much patient uses of above script per day. Per pharmacy information is needed for insurance purposes. Please call back and advise. Thank you.          Action Taken: Message routed to:  Other: endo    Travel Screening: Not Applicable

## 2022-06-09 NOTE — H&P
[facility]    History and Physical - Jefry 3 Service        Date of Admission:  1/18/2022    Assessment & Plan      Louie Alicia is a 26 year old male admitted on 1/18/2022. He has no significant past medical history and is admitted for first episode of diabetic ketoacidosis.    #Diabetic ketoacidosis  #Diabetes mellitus, new onset, likely type 1  Tito presented with 2 weeks of gradually increasing polyuria, polydipsia, and fatigue accompanied by otherwise unexplained weight loss. His blood sugar in clinic was 729 so he was referred to the [facility] ED.  There he was found to be in diabetic ketoacidosis with blood glucose of 443, pH 7.17, bicarb 11, A1c 11.6, positive urine and serum ketones.  No prior personal or family history of diabetes, no clear inciting event.  His gap has been closing nicely on insulin drip, BG now in the 140s, ketones decreasing but not fully cleared yet.  Discussed with endocrine team, will continue insulin drip for now pending afternoon labs but may be able to switch to subcutaneous insulin as early as this afternoon/evening.   -Endocrine consult  -Diabetes education per endocrine team  -BMP this afternoon and tomorrow morning  -Continue insulin drip and D5-0.45%NaCl + KCl for now, can likely stop fluids and switch to subcutaneous insulin this afternoon pending labs.  -Encouraging p.o. intake and working on carb counting  -PALOMO antibody pending  -Potassium replacement protocol  -Phosphorus replacement protocol    #DENISE, resolved  Presented with DENISE, creatinine to 1.42.  Unclear baseline but should be lower than this in an otherwise healthy young man.  Creatinine down to 0.7 this morning.  We will check again this afternoon but is likely at or close to baseline  -BMP this afternoon and tomorrow morning       Diet: Moderate Consistent Carb (60 g CHO per Meal) Diet    DVT Prophylaxis: Enoxaparin (Lovenox) SQ  Gil Catheter: Not  present  Fluids: D5-0.45% NaCl + KCl 30 mEq @ 125 mL/hr  Central Lines: None  Code Status: Full Code    Clinically Significant Risk Factors Present on Admission                     Disposition Plan   Expected Discharge: 01/20/2022     Anticipated discharge location:  Awaiting care coordination huddle  Delays:          The patient's care was discussed with the Attending Physician, Dr. Nagel, Bedside Nurse and Patient.    Bran Orlando MD  [address]  Securely message with the Vocera Web Console (learn more here)  Text page via [employer] Paging/Directory  ______________________________________________________________________    Chief Complaint   Hyperglycemia (noted on labs at PCP visit)    History is obtained from the patient    History of Present Illness   Louie Alicia is a 26 year old male who has no significant past medical history and is admitted for DKA.    Tito was in his usual state of health until about 2 weeks ago, when he noticed gradual onset of increased thirstiness, frequent urination, and general fatigue.  He also noticed that his calves and other muscles have been cramping up frequently over the past couple weeks, especially during exertion.  No abdominal pain, nausea, vomiting, diarrhea, rash, vision changes, respiratory difficulty, dysuria, signs of infection.  He also has noted gradual weight loss over the last month or two.  His weight normally runs around 170-175 lbs, when he weighed himself around Hardik his weight was 164.  On presentation last night his weight was 159 pounds.  He also could tell he was losing weight by how bony his legs felt.    No recent infections or other health issues.  He is active and quite healthy at baseline.  He has never had any significant medical issues.  Lives with 2 roommates, 1 of whom had COVID in December but Tito was never symptomatic and so did not get tested at that time.    In the  ED:  -, pH 7.1 bicarb 11, serum ketones 8.52, A1c 11.6  -COVID negative  -Started insulin drip, transferred to [facility] (no beds at [facility]) for further management    Review of Systems    The 10 point Review of Systems is negative other than noted in the HPI or here.    Past Medical History    -Intermittent tension type headaches    Past Surgical History   -Remote history of ACL repair    Social History   Grew up in [place], no lives in [place] with 2 roommates.  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  Enjoys golfing and staying active.  No tobacco use.  Estimates he has about 15 drinks per week, primarily on the weekends.  No recreational drug use.    Family History   I have reviewed this patient's family history and updated it with pertinent information if needed.  Family History   Problem Relation Age of Onset    Prostate Cancer Paternal Grandfather     EVELIN. Paternal Grandfather     Cancer - colorectal No family hx of        Prior to Admission Medications   None     Allergies   Allergies   Allergen Reactions    Amoxicillin     Cefprozil        Physical Exam   Vital Signs: Temp: 98.8  F (37.1  C) Temp src: Oral BP: 117/69 Pulse: 74   Resp: 12 SpO2: 97 % O2 Device: None (Room air)    Weight: 159 lbs 2.75 oz    Constitutional: awake, alert, cooperative, no apparent distress  Eyes: PERRL, EOMI, sclerae clear, conjunctivae normal  HEENT: Oropharynx clear, no exudates, mucous membranes tacky, lips dry  Respiratory: No increased work of breathing, good air movement, clear to auscultation bilaterally, no crackles or wheezing  Cardiovascular: RRR, no murmurs or gallops, warm and well perfused, no peripheral edema  GI: Normal bowel sounds, soft, non-distended, non-tender, no masses palpated  Skin: no rashes and no jaundice  Musculoskeletal: No susan deformity. There is no redness, warmth, or swelling of the joints.  Full range of motion noted.   Neurologic: Awake, alert, oriented to name, place and time.  Cranial  nerves II-XII are grossly intact.  Neuropsychiatric: Calm, appropriate affect    Data   Data reviewed today: I reviewed all medications, new labs and imaging results over the last 24 hours. I personally reviewed all labs.    Recent Labs   Lab 01/18/22  1222 01/18/22  1110 01/18/22  1007 01/18/22  0755 01/18/22  0720 01/18/22  0158 01/18/22  0003   WBC  --   --   --   --   --   --  7.8   HGB  --   --   --   --   --   --  15.5   MCV  --   --   --   --   --   --  89   PLT  --   --   --   --   --   --  236   NA  --   --   --   --  140  --  133*   POTASSIUM  --   --   --   --  3.3*  --  3.8   CHLORIDE  --   --   --   --  112*  --  100   CO2  --   --   --   --  17*  --  11*   BUN  --   --   --   --  15  --  17   CR  --   --   --   --  0.70  --  1.42*   ANIONGAP  --   --   --   --  11  --  22*   ASHVIN  --   --   --   --  8.5  --  10.7*   * 219* 152*   < > 187*   < > 492*   ALBUMIN  --   --   --   --  3.8  --  5.0   PROTTOTAL  --   --   --   --  6.9  --  8.3*   BILITOTAL  --   --   --   --  0.5  --  0.6   ALKPHOS  --   --   --   --  74  --  89   ALT  --   --   --   --  27  --  26   AST  --   --   --   --  10  --  17    < > = values in this interval not displayed.     Internal Medicine Staff Addendum  Date of Service: 1/18/2022    I have seen and examined this patient, reviewed the data and discussed the plan of care. I agree with the above documentation including plan and ddx unless otherwise stated:     #  DM-1 with DKA. No clear risk factors. Consider other auto-immune disorders when stabilized including celiac disease and adrenalitis, monitor with BMP after stabilization.     Yoni Nagel MD  Internal Medicine [facility]  Attending pager: [phone]       N/A

## 2022-08-20 ENCOUNTER — HEALTH MAINTENANCE LETTER (OUTPATIENT)
Age: 27
End: 2022-08-20

## 2022-11-20 ENCOUNTER — HEALTH MAINTENANCE LETTER (OUTPATIENT)
Age: 27
End: 2022-11-20

## 2022-12-24 ENCOUNTER — HEALTH MAINTENANCE LETTER (OUTPATIENT)
Age: 27
End: 2022-12-24

## 2023-01-12 DIAGNOSIS — E10.65 TYPE 1 DIABETES MELLITUS WITH HYPERGLYCEMIA (H): ICD-10-CM

## 2023-01-12 RX ORDER — PROCHLORPERAZINE 25 MG/1
SUPPOSITORY RECTAL
Qty: 3 EACH | Refills: 11 | Status: SHIPPED | OUTPATIENT
Start: 2023-01-12

## 2023-01-20 NOTE — PROGRESS NOTES
Louie Alicia  is being evaluated via a billable video visit.      How would you like to obtain your AVS? [employer]  For the video visit, send the invitation by: Text to cell phone: [phone]  Will anyone else be joining your video visit? No    Outcome for 01/20/23 8:48 AM: Dexcom code accurate.   Taylor Myhre, VF  Outcome for 01/25/23 11:53 AM: Dexcom emailed to provider  ALON Garcia  Outcome for 01/25/23 11:56 AM: Left Voicemail - inpen report needed.   ALON Garcia  Outcome for 01/25/23 12:03 PM: [employer] message sent  ALON Garcia  Outcome for 01/25/23 12:58 PM: Inpen Report sent via [employer].   ALON Garcia

## 2023-01-24 ENCOUNTER — TRANSFERRED RECORDS (OUTPATIENT)
Dept: HEALTH INFORMATION MANAGEMENT | Facility: CLINIC | Age: 28
End: 2023-01-24
Payer: COMMERCIAL

## 2023-01-25 ENCOUNTER — TELEPHONE (OUTPATIENT)
Dept: ENDOCRINOLOGY | Facility: CLINIC | Age: 28
End: 2023-01-25
Payer: COMMERCIAL

## 2023-01-25 NOTE — TELEPHONE ENCOUNTER
Called patient and left voicemail. Patient has an appointment on 1/27/23. Need patient to upload their Inpen device to site for provider to review prior to their appointment.  Leonie Martinez on 1/25/2023 at 12:19 PM

## 2023-01-27 ENCOUNTER — TELEPHONE (OUTPATIENT)
Dept: ENDOCRINOLOGY | Facility: CLINIC | Age: 28
End: 2023-01-27

## 2023-01-27 ENCOUNTER — VIRTUAL VISIT (OUTPATIENT)
Dept: ENDOCRINOLOGY | Facility: CLINIC | Age: 28
End: 2023-01-27
Payer: COMMERCIAL

## 2023-01-27 DIAGNOSIS — E10.9 TYPE 1 DIABETES MELLITUS WITHOUT COMPLICATION (H): ICD-10-CM

## 2023-01-27 DIAGNOSIS — E10.65 TYPE 1 DIABETES MELLITUS WITH HYPERGLYCEMIA (H): Primary | ICD-10-CM

## 2023-01-27 DIAGNOSIS — E10.10 DIABETIC KETOACIDOSIS WITHOUT COMA ASSOCIATED WITH TYPE 1 DIABETES MELLITUS (H): ICD-10-CM

## 2023-01-27 PROCEDURE — 99215 OFFICE O/P EST HI 40 MIN: CPT | Mod: 95 | Performed by: INTERNAL MEDICINE

## 2023-01-27 RX ORDER — INSULIN GLARGINE-YFGN 100 [IU]/ML
14 INJECTION, SOLUTION SUBCUTANEOUS AT BEDTIME
Qty: 15 ML | Refills: 3 | Status: SHIPPED | OUTPATIENT
Start: 2023-01-27 | End: 2024-01-23

## 2023-01-27 NOTE — PATIENT INSTRUCTIONS
PLAN:   Lab at your convenience -- fasting lab  Continue Glargine (Semglee) -- 14 units at night  Continue Novolog 1 unit per 8 gram carb with meal  Novolog correction scale  Before meals---  For Pre-Meal  - 164 give 1 unit.   For Pre-Meal  - 189 give 2 units.   For Pre-Meal  - 214 give 3 units.   For Pre-Meal  - 239 give 4 units.   For Pre-Meal  - 264 give 5 units.   For Pre-Meal  - 289 give 6 units.   For Pre-Meal  - 314 give 7 units.   For Pre-Meal  - 339 give 8 units.   For Pre-Meal  - 364 give 9 units.   For Pre-Meal BG greater than or equal to 365 give 10 units     Before bedtime---  For  - 224 give 1 units.   For  - 249 give 2 units.   For  - 274 give 3 units.   For  - 299 give 4 units.   For  - 324 give 5 units.   For  - 349 give 6 units.   For BG greater than or equal to 350 give 7 units.     Return in 6 months    If you have any questions, please do not hesitate to call clinic line at [phone] and ask for Endocrinology clinic.  If you need to fax, please fax to clinic fax number at [phone]    After clinic hours or weekends, please contact [phone] and ask for Endocrinologist-on call      Sincerely,    Laurence Menendez MD  Endocrinology

## 2023-01-27 NOTE — NURSING NOTE
Patient denies any changes since echeck-in regarding medication and allergies and states all information entered during echeck-in remains accurate.    Sachi Green MA

## 2023-01-27 NOTE — PROGRESS NOTES
Endocrinology Note         Louie is a 27 year old male presents today for type 1 diabetes    HPI  Louie is a 27 year old male previously healthy presents today for follow up type 1 diabetes    He was admitted for newly diagnose type 1 diabetes after he was found to have random glucose of 700s. He was initially went to see the doctor after noticing significant weight loss, increased fatigue and increased urination. He found to have DKA and was started IVF and insulin drip. A1c was 11.6, PALOMO Ab was positive >250. TSH 2.64. He was subsequently transitioned to subcutaneous insulin later. He met with CDE during hospitalization and feels confident with carbohydrate counting. In retrospect, he reports 2 months of 20 lbs weight loss. His usual baseline weight was 175 lbs. Weight during admission was 159 lbs. Two weeks prior to admission, he noticed increased fatigue and urination. He denied blurred vision, SOB, chest pain, palpitation, abdominal pain, nausea, vomiting, skin changes or hair loss.    Interim history  He said that overall he is doing well. BG are mostly in range except after meal. He usually has to walk after meal to bring BG down. He notices that BG stay longer if he eats high carb and high fat meal.   Reviewed Dexcom and Inpen I the past month          Current medication regimen  Glargine (Semglee) -- 14 units at night  Novolog 1 unit per 8 gram carb with meal  Novolog correction scale  Before meals---  For Pre-Meal  - 164 give 1 unit.   For Pre-Meal  - 189 give 2 units.   For Pre-Meal  - 214 give 3 units.   For Pre-Meal  - 239 give 4 units.   For Pre-Meal  - 264 give 5 units.   For Pre-Meal  - 289 give 6 units.   For Pre-Meal  - 314 give 7 units.   For Pre-Meal  - 339 give 8 units.   For Pre-Meal  - 364 give 9 units.   For Pre-Meal BG greater than or equal to 365 give 10 units     Before bedtime---  For  - 224 give 1 units.   For  -  249 give 2 units.   For  - 274 give 3 units.   For  - 299 give 4 units.   For  - 324 give 5 units.   For  - 349 give 6 units.   For BG greater than or equal to 350 give 7 units.     Diet recall:  BF: toast with PB, hard boil egg  Lunch: Turkey sandwich, yogurt, salad  Dinner: varies    Exercise -- 20-30 minutes -- Peloton bike about 2-3 times per week usually in the morning  Denied family hx of type 1 or type 2 diabetes or autoimmune diseases    DM complications:  Retinopathy: Dec 2022 at [facility] -- normal   Nephropathy: has not had urine protein   Neuropathy: notices some cold feet easily but no tingling or numbness    Past Medical History  Type 1 diabetes    Allergies  Allergies   Allergen Reactions     Amoxicillin      1/18/21: Pt does not know what his reaction was (occurred as a child)     Cefprozil      1/18/21: Pt does not know what his reaction was (occurred as a child)       Medications  Current Outpatient Medications   Medication Sig Dispense Refill     acetone urine (KETOSTIX) test strip Use to check urine ketones in the event of glucose over 250 mg/dL and not resolving with correction insulin.  Call endocrinology if ketones measuring small or greater. 50 strip 3     Alcohol Swabs (SM ALCOHOL PREP) 70 % PADS Apply 1 swab topically See Admin Instructions with injection or lancet 10 times per day or as directed. 300 each 11     blood glucose (NO BRAND SPECIFIED) lancets standard To use to test glucose level in the blood. Use to test blood sugar  4  times daily as directed. To accompany glucose monitor brands per insurance coverage. 400 each 1     blood glucose (NO BRAND SPECIFIED) test strip To use to test glucose level in the blood. Use to test blood sugar 4 times daily as directed. To accompany glucose monitor brands per insurance coverage. 100 strip 11     blood glucose monitoring (NO BRAND SPECIFIED) meter device kit Use as directed. Per insurance coverage 1 kit 0      Continuous Blood Gluc  (DEXCOM G6 ) VIRI Use to read blood sugars as per 's instructions. 1 each 0     Continuous Blood Gluc Sensor (DEXCOM G6 SENSOR) MISC CHANGE EVERY 10 DAYS 3 each 11     Continuous Blood Gluc Transmit (DEXCOM G6 TRANSMITTER) MISC Change every 3 months. 1 each 3     glucagon 1 MG kit Inject 1 mg Subcutaneous once as needed for low blood sugar (Use as directed by provider) 1 each 0     glucose (BD GLUCOSE) 4 g chewable tablet Take 4 tablets by mouth every 15 minutes as needed for low blood sugar 50 tablet 0     glucose 40 % (400 mg/mL) gel 15 g every 15 minutes by mouth as needed for low blood sugar. Oral gel is preferable for conscious and able to swallow patient. 112.5 g 0     Injection Device for insulin (INPEN 100-GREY-NOVOLOG-FIASP) VIRI 1 each continuous 1 each 0     insulin aspart (NOVOLOG PEN) 100 UNIT/ML pen With snacks or supplements. DOSE:  1 units per 30 grams of carbohydrate. Only chart total amount of units given.  Do not give if pre-prandial glucose is less than 60 mg/dL. If given at mealtime, administer within 15 minutes of start of meal.Approx 15 units daily 15 mL 1     insulin aspart (NOVOPEN ECHO) 100 UNIT/ML cartridge Use in In-Pen device.  Administer for all meals and snacks:  1 unit per 30 grams CHO and follow correction scale.  Average daily use is 15 units. 15 mL 3     Insulin Glargine-yfgn (SEMGLEE, YFGN,) 100 UNIT/ML SOPN Inject 9 Units Subcutaneous At Bedtime 15 mL 1     insulin pen needle (TRUEPLUS PEN NEEDLES) 32G X 4 MM miscellaneous Use 5 daily 450 each 3     multivitamin w/minerals (THERA-VIT-M) tablet Take 1 tablet by mouth daily       Sharps Container MISC Use as directed to dispose of needles, lancets and other sharps. Per Insurance coverage 1 each 0     Family History  family history includes C.A.D. in his paternal grandfather; Prostate Cancer in his paternal grandfather.   Father and mother are healthy    Social History  Social  History     Tobacco Use     Smoking status: Never     Smokeless tobacco: Never   drink alcohol socially -- usually beer  No smoking  No drug  Work --    Single, no children    ROS  10 points ROS were negative otherwise mentioned in HPI    Physical Exam  Limited due to virtual visit  Constitutional: no distress, comfortable, pleasant   Eyes: anicteric  Psychological: appropriate mood       RESULTS  I have personally reviewed labs and images. I also reviewed labs with patient and discussed the result and plan of care.  ENDO DIABETES Latest Ref Rng & Units 1/18/2022   A1C <=5.6 % 11.6 (H)   ALT 0 - 70 U/L 26   AST 0 - 45 U/L 17   CREATININE 0.66 - 1.25 mg/dL 1.42 (H)     ASSESSMENT:    Louie is a 27 year old male previously healthy presents today for type 1 diabetes    1) type 1 diabetes: Dx after presented with 20 lbs weight loss, increased urination and fatigue and found to have DKA. He is quite knowledgeble about type 1 diabetes and management including carb counting and hypoglycemic rescue.  - currently using Dexcom and InPen. -- BGs are in range with TIR 94%    - continue  Glargine (Semglee) -- 14 units at night  - continue Novolog 1 unit per 8 gram carb with meal  - discuss insulin pump - he is not interested in the pump right now    2) DM complications:  Retinopathy: recent eye exam last month Dec 2022 at [facility] -- ask him to fax the report  Nephropathy: will check for urine microalbumin  Neuropathy: none, will test at the next in-person visit    PLAN:   Continue Glargine (Semglee) -- 14 units at night  Continue Novolog 1 unit per 8 gram carb with meal  Novolog correction scale  Before meals---  For Pre-Meal  - 164 give 1 unit.   For Pre-Meal  - 189 give 2 units.   For Pre-Meal  - 214 give 3 units.   For Pre-Meal  - 239 give 4 units.   For Pre-Meal  - 264 give 5 units.   For Pre-Meal  - 289 give 6 units.   For Pre-Meal  - 314 give 7 units.   For  Pre-Meal  - 339 give 8 units.   For Pre-Meal  - 364 give 9 units.   For Pre-Meal BG greater than or equal to 365 give 10 units     Before bedtime---  For  - 224 give 1 units.   For  - 249 give 2 units.   For  - 274 give 3 units.   For  - 299 give 4 units.   For  - 324 give 5 units.   For  - 349 give 6 units.   For BG greater than or equal to 350 give 7 units.     Return in 6 months    Joined the call at 1/27/2023, 7:54:39 am.  Left the call at 1/27/2023, 8:26:32 am.  You were on the call for 31 minutes 52 seconds .    External notes/medical records independently reviewed, labs and imaging independently reviewed, medical management and tests to be discussed/communicated to patient.    Time: I spent  52 minutes spent on the date of the encounter preparing to see patient (including chart review and preparation), obtaining and or reviewing additional medical history, performing a physical exam and evaluation, documenting clinical information in the electronic health record, independently interpreting results, communicating results to the patient and coordinating care.    Laurence Menendez MD  Division of Diabetes and Endocrinology  Department of Medicine  [phone]

## 2023-01-27 NOTE — LETTER
1/27/2023       RE: Louie Alicia  [address]  [place] [address]     Dear Colleague,    Thank you for referring your patient, Louie Alicia, to the [facility] ENDOCRINOLOGY [facility]IC [place] at [facility]. Please see a copy of my visit note below.           Endocrinology Note         Louie is a 27 year old male presents today for type 1 diabetes    HPI  Louie is a 27 year old male previously healthy presents today for follow up type 1 diabetes    He was admitted for newly diagnose type 1 diabetes after he was found to have random glucose of 700s. He was initially went to see the doctor after noticing significant weight loss, increased fatigue and increased urination. He found to have DKA and was started IVF and insulin drip. A1c was 11.6, PALOMO Ab was positive >250. TSH 2.64. He was subsequently transitioned to subcutaneous insulin later. He met with CDE during hospitalization and feels confident with carbohydrate counting. In retrospect, he reports 2 months of 20 lbs weight loss. His usual baseline weight was 175 lbs. Weight during admission was 159 lbs. Two weeks prior to admission, he noticed increased fatigue and urination. He denied blurred vision, SOB, chest pain, palpitation, abdominal pain, nausea, vomiting, skin changes or hair loss.    Interim history  He said that overall he is doing well. BG are mostly in range except after meal. He usually has to walk after meal to bring BG down. He notices that BG stay longer if he eats high carb and high fat meal.   Reviewed Dexcom and Inpen I the past month          Current medication regimen  Glargine (Semglee) -- 14 units at night  Novolog 1 unit per 8 gram carb with meal  Novolog correction scale  Before meals---  For Pre-Meal  - 164 give 1 unit.   For Pre-Meal  - 189 give 2 units.   For Pre-Meal  - 214 give 3 units.   For Pre-Meal  - 239 give 4 units.   For  Pre-Meal  - 264 give 5 units.   For Pre-Meal  - 289 give 6 units.   For Pre-Meal  - 314 give 7 units.   For Pre-Meal  - 339 give 8 units.   For Pre-Meal  - 364 give 9 units.   For Pre-Meal BG greater than or equal to 365 give 10 units     Before bedtime---  For  - 224 give 1 units.   For  - 249 give 2 units.   For  - 274 give 3 units.   For  - 299 give 4 units.   For  - 324 give 5 units.   For  - 349 give 6 units.   For BG greater than or equal to 350 give 7 units.     Diet recall:  BF: toast with PB, hard boil egg  Lunch: Turkey sandwich, yogurt, salad  Dinner: varies    Exercise -- 20-30 minutes -- Peloton bike about 2-3 times per week usually in the morning  Denied family hx of type 1 or type 2 diabetes or autoimmune diseases    DM complications:  Retinopathy: Dec 2022 at [employer] -- normal   Nephropathy: has not had urine protein   Neuropathy: notices some cold feet easily but no tingling or numbness    Past Medical History  Type 1 diabetes    Allergies  Allergies   Allergen Reactions     Amoxicillin      1/18/21: Pt does not know what his reaction was (occurred as a child)     Cefprozil      1/18/21: Pt does not know what his reaction was (occurred as a child)       Medications  Current Outpatient Medications   Medication Sig Dispense Refill     acetone urine (KETOSTIX) test strip Use to check urine ketones in the event of glucose over 250 mg/dL and not resolving with correction insulin.  Call endocrinology if ketones measuring small or greater. 50 strip 3     Alcohol Swabs (SM ALCOHOL PREP) 70 % PADS Apply 1 swab topically See Admin Instructions with injection or lancet 10 times per day or as directed. 300 each 11     blood glucose (NO BRAND SPECIFIED) lancets standard To use to test glucose level in the blood. Use to test blood sugar  4  times daily as directed. To accompany glucose monitor brands per insurance coverage. 400 each 1     blood  glucose (NO BRAND SPECIFIED) test strip To use to test glucose level in the blood. Use to test blood sugar 4 times daily as directed. To accompany glucose monitor brands per insurance coverage. 100 strip 11     blood glucose monitoring (NO BRAND SPECIFIED) meter device kit Use as directed. Per insurance coverage 1 kit 0     Continuous Blood Gluc  (DEXCOM G6 ) VIRI Use to read blood sugars as per 's instructions. 1 each 0     Continuous Blood Gluc Sensor (DEXCOM G6 SENSOR) MISC CHANGE EVERY 10 DAYS 3 each 11     Continuous Blood Gluc Transmit (DEXCOM G6 TRANSMITTER) MISC Change every 3 months. 1 each 3     glucagon 1 MG kit Inject 1 mg Subcutaneous once as needed for low blood sugar (Use as directed by provider) 1 each 0     glucose (BD GLUCOSE) 4 g chewable tablet Take 4 tablets by mouth every 15 minutes as needed for low blood sugar 50 tablet 0     glucose 40 % (400 mg/mL) gel 15 g every 15 minutes by mouth as needed for low blood sugar. Oral gel is preferable for conscious and able to swallow patient. 112.5 g 0     Injection Device for insulin (INPEN 100-GREY-NOVOLOG-FIASP) VIRI 1 each continuous 1 each 0     insulin aspart (NOVOLOG PEN) 100 UNIT/ML pen With snacks or supplements. DOSE:  1 units per 30 grams of carbohydrate. Only chart total amount of units given.  Do not give if pre-prandial glucose is less than 60 mg/dL. If given at mealtime, administer within 15 minutes of start of meal.Approx 15 units daily 15 mL 1     insulin aspart (NOVOPEN ECHO) 100 UNIT/ML cartridge Use in In-Pen device.  Administer for all meals and snacks:  1 unit per 30 grams CHO and follow correction scale.  Average daily use is 15 units. 15 mL 3     Insulin Glargine-yfgn (SEMGLEE, YFGN,) 100 UNIT/ML SOPN Inject 9 Units Subcutaneous At Bedtime 15 mL 1     insulin pen needle (TRUEPLUS PEN NEEDLES) 32G X 4 MM miscellaneous Use 5 daily 450 each 3     multivitamin w/minerals (THERA-VIT-M) tablet Take 1 tablet by  mouth daily       Sharps Container MISC Use as directed to dispose of needles, lancets and other sharps. Per Insurance coverage 1 each 0     Family History  family history includes C.A.D. in his paternal grandfather; Prostate Cancer in his paternal grandfather.   Father and mother are healthy    Social History  Social History     Tobacco Use     Smoking status: Never     Smokeless tobacco: Never   drink alcohol socially -- usually beer  No smoking  No drug  Work --    Single, no children    ROS  10 points ROS were negative otherwise mentioned in HPI    Physical Exam  Limited due to virtual visit  Constitutional: no distress, comfortable, pleasant   Eyes: anicteric  Psychological: appropriate mood       RESULTS  I have personally reviewed labs and images. I also reviewed labs with patient and discussed the result and plan of care.  ENDO DIABETES Latest Ref Rng & Units 1/18/2022   A1C <=5.6 % 11.6 (H)   ALT 0 - 70 U/L 26   AST 0 - 45 U/L 17   CREATININE 0.66 - 1.25 mg/dL 1.42 (H)     ASSESSMENT:    Louie is a 27 year old male previously healthy presents today for type 1 diabetes    1) type 1 diabetes: Dx after presented with 20 lbs weight loss, increased urination and fatigue and found to have DKA. He is quite knowledgeble about type 1 diabetes and management including carb counting and hypoglycemic rescue.  - currently using Dexcom and InPen. -- BGs are in range with TIR 94%    - continue  Glargine (Semglee) -- 14 units at night  - continue Novolog 1 unit per 8 gram carb with meal  - discuss insulin pump - he is not interested in the pump right now    2) DM complications:  Retinopathy: recent eye exam last month Dec 2022 at [employer] [facility] -- ask him to fax the report  Nephropathy: will check for urine microalbumin  Neuropathy: none, will test at the next in-person visit    PLAN:   Continue Glargine (Semglee) -- 14 units at night  Continue Novolog 1 unit per 8 gram carb with meal  Novolog correction  scale  Before meals---  For Pre-Meal  - 164 give 1 unit.   For Pre-Meal  - 189 give 2 units.   For Pre-Meal  - 214 give 3 units.   For Pre-Meal  - 239 give 4 units.   For Pre-Meal  - 264 give 5 units.   For Pre-Meal  - 289 give 6 units.   For Pre-Meal  - 314 give 7 units.   For Pre-Meal  - 339 give 8 units.   For Pre-Meal  - 364 give 9 units.   For Pre-Meal BG greater than or equal to 365 give 10 units     Before bedtime---  For  - 224 give 1 units.   For  - 249 give 2 units.   For  - 274 give 3 units.   For  - 299 give 4 units.   For  - 324 give 5 units.   For  - 349 give 6 units.   For BG greater than or equal to 350 give 7 units.     Return in 6 months    Joined the call at 1/27/2023, 7:54:39 am.  Left the call at 1/27/2023, 8:26:32 am.  You were on the call for 31 minutes 52 seconds .    External notes/medical records independently reviewed, labs and imaging independently reviewed, medical management and tests to be discussed/communicated to patient.    Time: I spent  52 minutes spent on the date of the encounter preparing to see patient (including chart review and preparation), obtaining and or reviewing additional medical history, performing a physical exam and evaluation, documenting clinical information in the electronic health record, independently interpreting results, communicating results to the patient and coordinating care.    Laurence Menendez MD  Division of Diabetes and Endocrinology  Department of Medicine  [phone]

## 2023-02-01 ENCOUNTER — LAB (OUTPATIENT)
Dept: LAB | Facility: CLINIC | Age: 28
End: 2023-02-01
Payer: COMMERCIAL

## 2023-02-01 DIAGNOSIS — E10.65 TYPE 1 DIABETES MELLITUS WITH HYPERGLYCEMIA (H): ICD-10-CM

## 2023-02-01 LAB
CHOLEST SERPL-MCNC: 146 MG/DL
CREAT SERPL-MCNC: 0.97 MG/DL (ref 0.67–1.17)
CREAT UR-MCNC: 502 MG/DL
GFR SERPL CREATININE-BSD FRML MDRD: >90 ML/MIN/1.73M2
HBA1C MFR BLD: 5.4 % (ref 0–5.6)
HDLC SERPL-MCNC: 54 MG/DL
LDLC SERPL CALC-MCNC: 80 MG/DL
MICROALBUMIN UR-MCNC: 108 MG/L
MICROALBUMIN/CREAT UR: 21.51 MG/G CR (ref 0–17)
NONHDLC SERPL-MCNC: 92 MG/DL
TRIGL SERPL-MCNC: 62 MG/DL

## 2023-02-01 PROCEDURE — 80061 LIPID PANEL: CPT

## 2023-02-01 PROCEDURE — 82570 ASSAY OF URINE CREATININE: CPT

## 2023-02-01 PROCEDURE — 82565 ASSAY OF CREATININE: CPT

## 2023-02-01 PROCEDURE — 82043 UR ALBUMIN QUANTITATIVE: CPT

## 2023-02-01 PROCEDURE — 83036 HEMOGLOBIN GLYCOSYLATED A1C: CPT

## 2023-02-01 PROCEDURE — 36415 COLL VENOUS BLD VENIPUNCTURE: CPT

## 2023-02-23 DIAGNOSIS — E10.65 TYPE 1 DIABETES MELLITUS WITH HYPERGLYCEMIA (H): ICD-10-CM

## 2023-02-27 RX ORDER — PROCHLORPERAZINE 25 MG/1
SUPPOSITORY RECTAL
Qty: 1 EACH | Refills: 3 | Status: SHIPPED | OUTPATIENT
Start: 2023-02-27 | End: 2024-01-16

## 2023-04-15 ENCOUNTER — HEALTH MAINTENANCE LETTER (OUTPATIENT)
Age: 28
End: 2023-04-15

## 2023-06-02 ENCOUNTER — HEALTH MAINTENANCE LETTER (OUTPATIENT)
Age: 28
End: 2023-06-02

## 2023-06-12 DIAGNOSIS — E10.10 DIABETIC KETOACIDOSIS WITHOUT COMA ASSOCIATED WITH TYPE 1 DIABETES MELLITUS (H): ICD-10-CM

## 2023-06-13 RX ORDER — PEN NEEDLE, DIABETIC 32GX 5/32"
NEEDLE, DISPOSABLE MISCELLANEOUS
Qty: 450 EACH | Refills: 3 | Status: SHIPPED | OUTPATIENT
Start: 2023-06-13 | End: 2024-04-22

## 2023-06-13 NOTE — TELEPHONE ENCOUNTER
Pen tips      1/27/2023  [facility] Endocrinology Clinic [place]     Laurence Menendez MD  Endocrinology, Diabetes, and Metabolism

## 2023-09-13 NOTE — PROGRESS NOTES
Outcome for 09/13/23 2:13 PM: Connected Datahart message sent  Outcome for 09/13/23 2:13 PM: Data uploaded on Dexcom  Sachi Green MA  Outcome for 09/20/23 1:33 PM: Data obtained via Dexcom and [employer] website  Sachi Green MA

## 2023-09-16 ENCOUNTER — HEALTH MAINTENANCE LETTER (OUTPATIENT)
Age: 28
End: 2023-09-16

## 2023-09-22 ENCOUNTER — VIRTUAL VISIT (OUTPATIENT)
Dept: ENDOCRINOLOGY | Facility: CLINIC | Age: 28
End: 2023-09-22
Payer: COMMERCIAL

## 2023-09-22 DIAGNOSIS — E10.10 DIABETIC KETOACIDOSIS WITHOUT COMA ASSOCIATED WITH TYPE 1 DIABETES MELLITUS (H): ICD-10-CM

## 2023-09-22 DIAGNOSIS — E10.65 TYPE 1 DIABETES MELLITUS WITH HYPERGLYCEMIA (H): Primary | ICD-10-CM

## 2023-09-22 PROCEDURE — 99214 OFFICE O/P EST MOD 30 MIN: CPT | Mod: VID | Performed by: INTERNAL MEDICINE

## 2023-09-22 RX ORDER — ACYCLOVIR 400 MG/1
1 TABLET ORAL ONCE
Qty: 1 EACH | Refills: 0 | Status: SHIPPED | OUTPATIENT
Start: 2023-09-22 | End: 2023-09-22

## 2023-09-22 RX ORDER — ACYCLOVIR 400 MG/1
1 TABLET ORAL
Qty: 3 EACH | Refills: 5 | Status: SHIPPED | OUTPATIENT
Start: 2023-09-22 | End: 2024-01-16

## 2023-09-22 NOTE — PATIENT INSTRUCTIONS
PLAN:   Get A1c test at your convenience  Continue Glargine (Semglee) -- 17 units at night  Continue Novolog 1 unit per 8 gram carb with meal  Novolog correction scale  Before meals---  For Pre-Meal  - 164 give 1 unit.   For Pre-Meal  - 189 give 2 units.   For Pre-Meal  - 214 give 3 units.   For Pre-Meal  - 239 give 4 units.   For Pre-Meal  - 264 give 5 units.   For Pre-Meal  - 289 give 6 units.   For Pre-Meal  - 314 give 7 units.   For Pre-Meal  - 339 give 8 units.   For Pre-Meal  - 364 give 9 units.   For Pre-Meal BG greater than or equal to 365 give 10 units     Before bedtime---  For  - 224 give 1 units.   For  - 249 give 2 units.   For  - 274 give 3 units.   For  - 299 give 4 units.   For  - 324 give 5 units.   For  - 349 give 6 units.   For BG greater than or equal to 350 give 7 units.     Return in 6 months

## 2023-09-22 NOTE — LETTER
9/22/2023       RE: Louie Alicia  [address]  [place] [address]     Dear Colleague,    Thank you for referring your patient, Louie Alicia, to the [facility] ENDOCRINOLOGY CLINIC [place] at [facility]. Please see a copy of my visit note below.    Outcome for 09/13/23 2:13 PM: BitePal message sent  Outcome for 09/13/23 2:13 PM: Data uploaded on Dexcom  Sachi Green MA  Outcome for 09/20/23 1:33 PM: Data obtained via Dexcom and [employer] website  Sachi Green MA                                 Endocrinology Note         Louie is a 28 year old male presents today for type 1 diabetes    HPI  Louie is a 28 year old male previously healthy presents today for follow up type 1 diabetes    He was admitted for newly diagnose type 1 diabetes after he was found to have random glucose of 700s in January 2022. His initial symptoms were significant weight loss, increased fatigue and increased urination. He found to have DKA and was started IVF and insulin drip. A1c was 11.6, PALOMO Ab was positive >250. TSH 2.64. He was subsequently transitioned to subcutaneous insulin later. He met with CDE during hospitalization and feels confident with carbohydrate counting. In retrospect, he reports 2 months of 20 lbs weight loss. His usual baseline weight was 175 lbs. Weight during admission was 159 lbs.     Interim history  Last seen 1/2023. He has been doing well except that he did have dizziness, fatigue, sore muscle and brain fog a few weeks ago following 3 days of staying outside, golfing and drinking a bit more EtOH. Since then he has been doing better.     In general, his BG are mostly in range.  He usually walked after lunch. He notices that BG stay longer if he eats high carb and high fat meal. Reviewed Dexcom and Inpen in the past 2 weeks.        Current medication regimen  Glargine (Semglee) -- 17 units at night  Novolog 1 unit per 8 gram carb  with meal  Novolog correction scale  Before meals---  For Pre-Meal  - 164 give 1 unit.   For Pre-Meal  - 189 give 2 units.   For Pre-Meal  - 214 give 3 units.   For Pre-Meal  - 239 give 4 units.   For Pre-Meal  - 264 give 5 units.   For Pre-Meal  - 289 give 6 units.   For Pre-Meal  - 314 give 7 units.   For Pre-Meal  - 339 give 8 units.   For Pre-Meal  - 364 give 9 units.   For Pre-Meal BG greater than or equal to 365 give 10 units     Before bedtime---  For  - 224 give 1 units.   For  - 249 give 2 units.   For  - 274 give 3 units.   For  - 299 give 4 units.   For  - 324 give 5 units.   For  - 349 give 6 units.   For BG greater than or equal to 350 give 7 units.     Exercise -- 20-30 minutes -- Peloton bike about 2-3 times per week and golfing    He denied blurred vision, SOB, chest pain, palpitation, abdominal pain, nausea, vomiting, skin changes or hair loss.    DM complications:  Retinopathy: Dec 2022 at [employer] -- normal   Nephropathy: normal  Neuropathy: notices some cold feet easily but no tingling or numbness    Past Medical History  Type 1 diabetes    Allergies  Allergies   Allergen Reactions     Amoxicillin      1/18/21: Pt does not know what his reaction was (occurred as a child)     Cefprozil      1/18/21: Pt does not know what his reaction was (occurred as a child)       Medications  Current Outpatient Medications   Medication Sig Dispense Refill     acetone urine (KETOSTIX) test strip Use to check urine ketones in the event of glucose over 250 mg/dL and not resolving with correction insulin.  Call endocrinology if ketones measuring small or greater. 50 strip 3     Alcohol Swabs (SM ALCOHOL PREP) 70 % PADS Apply 1 swab topically See Admin Instructions with injection or lancet 10 times per day or as directed. 300 each 11     blood glucose (NO BRAND SPECIFIED) lancets standard To use to test glucose level in the  blood. Use to test blood sugar  4  times daily as directed. To accompany glucose monitor brands per insurance coverage. 400 each 1     blood glucose (NO BRAND SPECIFIED) test strip To use to test glucose level in the blood. Use to test blood sugar 4 times daily as directed. To accompany glucose monitor brands per insurance coverage. 100 strip 11     blood glucose monitoring (NO BRAND SPECIFIED) meter device kit Use as directed. Per insurance coverage 1 kit 0     Continuous Blood Gluc  (DEXCOM G6 ) VIRI Use to read blood sugars as per 's instructions. 1 each 0     Continuous Blood Gluc Sensor (DEXCOM G6 SENSOR) MISC CHANGE EVERY 10 DAYS 3 each 11     Continuous Blood Gluc Transmit (DEXCOM G6 TRANSMITTER) MISC Change every 3 months. 1 each 3     glucagon 1 MG kit Inject 1 mg Subcutaneous once as needed for low blood sugar (Use as directed by provider) 1 each 0     glucose (BD GLUCOSE) 4 g chewable tablet Take 4 tablets by mouth every 15 minutes as needed for low blood sugar 50 tablet 0     glucose 40 % (400 mg/mL) gel 15 g every 15 minutes by mouth as needed for low blood sugar. Oral gel is preferable for conscious and able to swallow patient. 112.5 g 0     Injection Device for insulin (INPEN 100-GREY-NOVOLOG-FIASP) VIRI 1 each continuous 1 each 0     insulin aspart (NOVOLOG PEN) 100 UNIT/ML pen With snacks or supplements. DOSE:  1 units per 30 grams of carbohydrate. Only chart total amount of units given.  Do not give if pre-prandial glucose is less than 60 mg/dL. If given at mealtime, administer within 15 minutes of start of meal.Approx 15 units daily 15 mL 1     insulin aspart (NOVOPEN ECHO) 100 UNIT/ML cartridge Use in In-Pen device.  Administer for all meals and snacks:  1 unit per 8 grams CHO and follow correction scale.  Average daily use is 20 units. 30 mL 3     Insulin Glargine-yfgn (SEMGLEE, YFGN,) 100 UNIT/ML SOPN Inject 14 Units Subcutaneous At Bedtime 15 mL 3     multivitamin  w/minerals (THERA-VIT-M) tablet Take 1 tablet by mouth daily       PENTIPS 32G X 4 MM miscellaneous USE FIVE DAILY AS DIRECTED 450 each 3     Sharps Container MISC Use as directed to dispose of needles, lancets and other sharps. Per Insurance coverage 1 each 0     Family History  family history includes C.A.D. in his paternal grandfather; Prostate Cancer in his paternal grandfather.   Father and mother are healthy  Denied family hx of type 1 or type 2 diabetes or autoimmune diseases    Social History  Social History     Tobacco Use     Smoking status: Never     Smokeless tobacco: Never   drink alcohol socially -- usually beer  No smoking  No drug  Work --    Single, no children    ROS  10 points ROS were negative otherwise mentioned in HPI    Physical Exam  Limited due to virtual visit  Constitutional: no distress, comfortable, pleasant   Eyes: anicteric  Psychological: appropriate mood       RESULTS  I have personally reviewed labs and images. I also reviewed labs with patient and discussed the result and plan of care.    Lab Results   Component Value Date    A1C 5.4 02/01/2023    A1C 11.6 01/18/2022      Latest Ref Rng 2/1/2023  8:04 AM   ENDO DIABETES     Cholesterol <200 mg/dL 146    LDL Cholesterol Calculated <=100 mg/dL 80    HDL Cholesterol >=40 mg/dL 54    Non HDL Cholesterol <130 mg/dL 92    Triglycerides <150 mg/dL 62    TRIG (EXT) <150 mg/dL 62    Creatinine 0.67 - 1.17 mg/dL 0.97    Hematocrit 40.0 - 53.0 %    Hemoglobin 13.3 - 17.7 g/dL    Albumin Urine mg/L mg/L 108.0    Albumin Urine mg/g Cr 0.00 - 17.00 mg/g Cr 21.51 (H)       Legend:  (H) High  ASSESSMENT:    Louie is a 28 year old male previously healthy presents today for type 1 diabetes    1) type 1 diabetes: Dx after presented with 20 lbs weight loss, increased urination and fatigue and found to have DKA. He is quite knowledgeble about type 1 diabetes and management including carb counting and hypoglycemic rescue.  - currently  using Dexcom and InPen. -- BGs are in range with TIR 91%. Did have some low when he went out for a walk in the afternoon.    - continue  Glargine (Semglee) -- 17 units at night  - continue Novolog 1 unit per 8 gram carb with meal  - discuss insulin pump - he is not interested in the pump at this time    2) DM complications:  Retinopathy: eye exam last month Dec 2022 at [facility]   Nephropathy: will check for urine microalbumin   Neuropathy: none, will test at the next in-person visit    PLAN:   Continue Glargine (Semglee) -- 17 units at night  Continue Novolog 1 unit per 8 gram carb with meal  Novolog correction scale  Before meals---  For Pre-Meal  - 164 give 1 unit.   For Pre-Meal  - 189 give 2 units.   For Pre-Meal  - 214 give 3 units.   For Pre-Meal  - 239 give 4 units.   For Pre-Meal  - 264 give 5 units.   For Pre-Meal  - 289 give 6 units.   For Pre-Meal  - 314 give 7 units.   For Pre-Meal  - 339 give 8 units.   For Pre-Meal  - 364 give 9 units.   For Pre-Meal BG greater than or equal to 365 give 10 units     Before bedtime---  For  - 224 give 1 units.   For  - 249 give 2 units.   For  - 274 give 3 units.   For  - 299 give 4 units.   For  - 324 give 5 units.   For  - 349 give 6 units.   For BG greater than or equal to 350 give 7 units.     Check A1c and urine microalbumin  Return in 6 months     Joined the call at 9/22/2023, 9:49:38 am.  Left the call at 9/22/2023, 10:06:19 am.  You were on the call for 16 minutes 40 seconds .    External notes/medical records independently reviewed, labs and imaging independently reviewed, medical management and tests to be discussed/communicated to patient.    Time: I spent 30  minutes spent on the date of the encounter preparing to see patient (including chart review and preparation), obtaining and or reviewing additional medical history, performing a physical exam and evaluation,  documenting clinical information in the electronic health record, independently interpreting results, communicating results to the patient and coordinating care.    Laurence Menendez MD  Division of Diabetes and Endocrinology  Department of Medicine

## 2023-09-22 NOTE — PROGRESS NOTES
Endocrinology Note         Louie is a 28 year old male presents today for type 1 diabetes    HPI  Louie is a 28 year old male previously healthy presents today for follow up type 1 diabetes    He was admitted for newly diagnose type 1 diabetes after he was found to have random glucose of 700s in January 2022. His initial symptoms were significant weight loss, increased fatigue and increased urination. He found to have DKA and was started IVF and insulin drip. A1c was 11.6, PALOMO Ab was positive >250. TSH 2.64. He was subsequently transitioned to subcutaneous insulin later. He met with CDE during hospitalization and feels confident with carbohydrate counting. In retrospect, he reports 2 months of 20 lbs weight loss. His usual baseline weight was 175 lbs. Weight during admission was 159 lbs.     Interim history  Last seen 1/2023. He has been doing well except that he did have dizziness, fatigue, sore muscle and brain fog a few weeks ago following 3 days of staying outside, golfing and drinking a bit more EtOH. Since then he has been doing better.     In general, his BG are mostly in range.  He usually walked after lunch. He notices that BG stay longer if he eats high carb and high fat meal. Reviewed Dexcom and Inpen in the past 2 weeks.        Current medication regimen  Glargine (Semglee) -- 17 units at night  Novolog 1 unit per 8 gram carb with meal  Novolog correction scale  Before meals---  For Pre-Meal  - 164 give 1 unit.   For Pre-Meal  - 189 give 2 units.   For Pre-Meal  - 214 give 3 units.   For Pre-Meal  - 239 give 4 units.   For Pre-Meal  - 264 give 5 units.   For Pre-Meal  - 289 give 6 units.   For Pre-Meal  - 314 give 7 units.   For Pre-Meal  - 339 give 8 units.   For Pre-Meal  - 364 give 9 units.   For Pre-Meal BG greater than or equal to 365 give 10 units     Before bedtime---  For  - 224 give 1 units.   For  - 249 give 2 units.    For  - 274 give 3 units.   For  - 299 give 4 units.   For  - 324 give 5 units.   For  - 349 give 6 units.   For BG greater than or equal to 350 give 7 units.     Exercise -- 20-30 minutes -- Peloton bike about 2-3 times per week and golfing    He denied blurred vision, SOB, chest pain, palpitation, abdominal pain, nausea, vomiting, skin changes or hair loss.    DM complications:  Retinopathy: Dec 2022 at [facility] -- normal   Nephropathy: normal  Neuropathy: notices some cold feet easily but no tingling or numbness    Past Medical History  Type 1 diabetes    Allergies  Allergies   Allergen Reactions    Amoxicillin      1/18/21: Pt does not know what his reaction was (occurred as a child)    Cefprozil      1/18/21: Pt does not know what his reaction was (occurred as a child)       Medications  Current Outpatient Medications   Medication Sig Dispense Refill    acetone urine (KETOSTIX) test strip Use to check urine ketones in the event of glucose over 250 mg/dL and not resolving with correction insulin.  Call endocrinology if ketones measuring small or greater. 50 strip 3    Alcohol Swabs (SM ALCOHOL PREP) 70 % PADS Apply 1 swab topically See Admin Instructions with injection or lancet 10 times per day or as directed. 300 each 11    blood glucose (NO BRAND SPECIFIED) lancets standard To use to test glucose level in the blood. Use to test blood sugar  4  times daily as directed. To accompany glucose monitor brands per insurance coverage. 400 each 1    blood glucose (NO BRAND SPECIFIED) test strip To use to test glucose level in the blood. Use to test blood sugar 4 times daily as directed. To accompany glucose monitor brands per insurance coverage. 100 strip 11    blood glucose monitoring (NO BRAND SPECIFIED) meter device kit Use as directed. Per insurance coverage 1 kit 0    Continuous Blood Gluc  (DEXCOM G6 ) VIRI Use to read blood sugars as per 's instructions. 1  each 0    Continuous Blood Gluc Sensor (DEXCOM G6 SENSOR) MISC CHANGE EVERY 10 DAYS 3 each 11    Continuous Blood Gluc Transmit (DEXCOM G6 TRANSMITTER) MISC Change every 3 months. 1 each 3    glucagon 1 MG kit Inject 1 mg Subcutaneous once as needed for low blood sugar (Use as directed by provider) 1 each 0    glucose (BD GLUCOSE) 4 g chewable tablet Take 4 tablets by mouth every 15 minutes as needed for low blood sugar 50 tablet 0    glucose 40 % (400 mg/mL) gel 15 g every 15 minutes by mouth as needed for low blood sugar. Oral gel is preferable for conscious and able to swallow patient. 112.5 g 0    Injection Device for insulin (INPEN 100-GREY-NOVOLOG-FIASP) VIRI 1 each continuous 1 each 0    insulin aspart (NOVOLOG PEN) 100 UNIT/ML pen With snacks or supplements. DOSE:  1 units per 30 grams of carbohydrate. Only chart total amount of units given.  Do not give if pre-prandial glucose is less than 60 mg/dL. If given at mealtime, administer within 15 minutes of start of meal.Approx 15 units daily 15 mL 1    insulin aspart (NOVOPEN ECHO) 100 UNIT/ML cartridge Use in In-Pen device.  Administer for all meals and snacks:  1 unit per 8 grams CHO and follow correction scale.  Average daily use is 20 units. 30 mL 3    Insulin Glargine-yfgn (SEMGLEE, YFGN,) 100 UNIT/ML SOPN Inject 14 Units Subcutaneous At Bedtime 15 mL 3    multivitamin w/minerals (THERA-VIT-M) tablet Take 1 tablet by mouth daily      PENTIPS 32G X 4 MM miscellaneous USE FIVE DAILY AS DIRECTED 450 each 3    Sharps Container MISC Use as directed to dispose of needles, lancets and other sharps. Per Insurance coverage 1 each 0     Family History  family history includes C.A.D. in his paternal grandfather; Prostate Cancer in his paternal grandfather.   Father and mother are healthy  Denied family hx of type 1 or type 2 diabetes or autoimmune diseases    Social History  Social History     Tobacco Use    Smoking status: Never    Smokeless tobacco: Never   drink  alcohol socially -- usually beer  No smoking  No drug  Work --    Single, no children    ROS  10 points ROS were negative otherwise mentioned in HPI    Physical Exam  Limited due to virtual visit  Constitutional: no distress, comfortable, pleasant   Eyes: anicteric  Psychological: appropriate mood       RESULTS  I have personally reviewed labs and images. I also reviewed labs with patient and discussed the result and plan of care.    Lab Results   Component Value Date    A1C 5.4 02/01/2023    A1C 11.6 01/18/2022      Latest Ref Rng 2/1/2023  8:04 AM   ENDO DIABETES     Cholesterol <200 mg/dL 146    LDL Cholesterol Calculated <=100 mg/dL 80    HDL Cholesterol >=40 mg/dL 54    Non HDL Cholesterol <130 mg/dL 92    Triglycerides <150 mg/dL 62    TRIG (EXT) <150 mg/dL 62    Creatinine 0.67 - 1.17 mg/dL 0.97    Hematocrit 40.0 - 53.0 %    Hemoglobin 13.3 - 17.7 g/dL    Albumin Urine mg/L mg/L 108.0    Albumin Urine mg/g Cr 0.00 - 17.00 mg/g Cr 21.51 (H)       Legend:  (H) High  ASSESSMENT:    Louie is a 28 year old male previously healthy presents today for type 1 diabetes    1) type 1 diabetes: Dx after presented with 20 lbs weight loss, increased urination and fatigue and found to have DKA. He is quite knowledgeble about type 1 diabetes and management including carb counting and hypoglycemic rescue.  - currently using Dexcom and InPen. -- BGs are in range with TIR 91%. Did have some low when he went out for a walk in the afternoon.    - continue  Glargine (Semglee) -- 17 units at night  - continue Novolog 1 unit per 8 gram carb with meal  - discuss insulin pump - he is not interested in the pump at this time    2) DM complications:  Retinopathy: eye exam last month Dec 2022 at [facility]   Nephropathy: will check for urine microalbumin   Neuropathy: none, will test at the next in-person visit    PLAN:   Continue Glargine (Semglee) -- 17 units at night  Continue Novolog 1 unit per 8 gram carb with  meal  Novolog correction scale  Before meals---  For Pre-Meal  - 164 give 1 unit.   For Pre-Meal  - 189 give 2 units.   For Pre-Meal  - 214 give 3 units.   For Pre-Meal  - 239 give 4 units.   For Pre-Meal  - 264 give 5 units.   For Pre-Meal  - 289 give 6 units.   For Pre-Meal  - 314 give 7 units.   For Pre-Meal  - 339 give 8 units.   For Pre-Meal  - 364 give 9 units.   For Pre-Meal BG greater than or equal to 365 give 10 units     Before bedtime---  For  - 224 give 1 units.   For  - 249 give 2 units.   For  - 274 give 3 units.   For  - 299 give 4 units.   For  - 324 give 5 units.   For  - 349 give 6 units.   For BG greater than or equal to 350 give 7 units.     Check A1c and urine microalbumin  Return in 6 months     Joined the call at 9/22/2023, 9:49:38 am.  Left the call at 9/22/2023, 10:06:19 am.  You were on the call for 16 minutes 40 seconds .    External notes/medical records independently reviewed, labs and imaging independently reviewed, medical management and tests to be discussed/communicated to patient.    Time: I spent 30  minutes spent on the date of the encounter preparing to see patient (including chart review and preparation), obtaining and or reviewing additional medical history, performing a physical exam and evaluation, documenting clinical information in the electronic health record, independently interpreting results, communicating results to the patient and coordinating care.    Laurence Menendez MD  Division of Diabetes and Endocrinology  Department of Medicine

## 2023-09-22 NOTE — NURSING NOTE
No unit for every 9, 1 unit for every 40 units above  Semglee 17 units  Is the patient currently in the state of [place]? YES    Visit mode:VIDEO    If the visit is dropped, the patient can be reconnected by: VIDEO VISIT: Send to e-mail at: [email or web address]    Will anyone else be joining the visit? NO  (If patient encounters technical issues they should call [phone]    How would you like to obtain your AVS? MyChart    Are changes needed to the allergy or medication list? Yes patient reports now taking 17 units of Semglee and is taking Novolog (1 unit for every 9 grams and 1 unit for every 40)    Reason for visit: RECHECK (Patient wants to talk about getting G7)    Tammy WOODS

## 2023-09-26 ENCOUNTER — MYC MEDICAL ADVICE (OUTPATIENT)
Dept: ENDOCRINOLOGY | Facility: CLINIC | Age: 28
End: 2023-09-26
Payer: COMMERCIAL

## 2023-12-26 ENCOUNTER — OFFICE VISIT (OUTPATIENT)
Dept: FAMILY MEDICINE | Facility: CLINIC | Age: 28
End: 2023-12-26
Payer: COMMERCIAL

## 2023-12-26 VITALS
HEART RATE: 63 BPM | TEMPERATURE: 98.2 F | OXYGEN SATURATION: 100 % | BODY MASS INDEX: 24.35 KG/M2 | WEIGHT: 179.8 LBS | HEIGHT: 72 IN | RESPIRATION RATE: 16 BRPM | DIASTOLIC BLOOD PRESSURE: 73 MMHG | SYSTOLIC BLOOD PRESSURE: 117 MMHG

## 2023-12-26 DIAGNOSIS — E10.65 TYPE 1 DIABETES MELLITUS WITH HYPERGLYCEMIA (H): ICD-10-CM

## 2023-12-26 DIAGNOSIS — Z23 ENCOUNTER FOR IMMUNIZATION: ICD-10-CM

## 2023-12-26 DIAGNOSIS — M62.89 MUSCLE FATIGUE: ICD-10-CM

## 2023-12-26 DIAGNOSIS — Z00.00 ROUTINE GENERAL MEDICAL EXAMINATION AT A HEALTH CARE FACILITY: Primary | ICD-10-CM

## 2023-12-26 DIAGNOSIS — E10.9 TYPE 1 DIABETES MELLITUS WITHOUT COMPLICATION (H): ICD-10-CM

## 2023-12-26 LAB
ANION GAP SERPL CALCULATED.3IONS-SCNC: 9 MMOL/L (ref 7–15)
BUN SERPL-MCNC: 15.5 MG/DL (ref 6–20)
CALCIUM SERPL-MCNC: 9.3 MG/DL (ref 8.6–10)
CHLORIDE SERPL-SCNC: 105 MMOL/L (ref 98–107)
CREAT SERPL-MCNC: 0.73 MG/DL (ref 0.67–1.17)
CREAT UR-MCNC: 24.1 MG/DL
DEPRECATED HCO3 PLAS-SCNC: 25 MMOL/L (ref 22–29)
EGFRCR SERPLBLD CKD-EPI 2021: >90 ML/MIN/1.73M2
GLUCOSE SERPL-MCNC: 138 MG/DL (ref 70–99)
HBA1C MFR BLD: 5.1 % (ref 0–5.6)
MICROALBUMIN UR-MCNC: <12 MG/L
MICROALBUMIN/CREAT UR: NORMAL MG/G{CREAT}
POTASSIUM SERPL-SCNC: 4 MMOL/L (ref 3.4–5.3)
SODIUM SERPL-SCNC: 139 MMOL/L (ref 135–145)

## 2023-12-26 PROCEDURE — 90677 PCV20 VACCINE IM: CPT | Performed by: PHYSICIAN ASSISTANT

## 2023-12-26 PROCEDURE — 82570 ASSAY OF URINE CREATININE: CPT | Performed by: PHYSICIAN ASSISTANT

## 2023-12-26 PROCEDURE — 90472 IMMUNIZATION ADMIN EACH ADD: CPT | Performed by: PHYSICIAN ASSISTANT

## 2023-12-26 PROCEDURE — 36415 COLL VENOUS BLD VENIPUNCTURE: CPT | Performed by: PHYSICIAN ASSISTANT

## 2023-12-26 PROCEDURE — 99214 OFFICE O/P EST MOD 30 MIN: CPT | Mod: 25 | Performed by: PHYSICIAN ASSISTANT

## 2023-12-26 PROCEDURE — 90686 IIV4 VACC NO PRSV 0.5 ML IM: CPT | Performed by: PHYSICIAN ASSISTANT

## 2023-12-26 PROCEDURE — 99385 PREV VISIT NEW AGE 18-39: CPT | Mod: 25 | Performed by: PHYSICIAN ASSISTANT

## 2023-12-26 PROCEDURE — 90471 IMMUNIZATION ADMIN: CPT | Performed by: PHYSICIAN ASSISTANT

## 2023-12-26 PROCEDURE — 82043 UR ALBUMIN QUANTITATIVE: CPT | Performed by: PHYSICIAN ASSISTANT

## 2023-12-26 PROCEDURE — 80048 BASIC METABOLIC PNL TOTAL CA: CPT | Performed by: PHYSICIAN ASSISTANT

## 2023-12-26 PROCEDURE — 90480 ADMN SARSCOV2 VAC 1/ONLY CMP: CPT | Performed by: PHYSICIAN ASSISTANT

## 2023-12-26 PROCEDURE — 91320 SARSCV2 VAC 30MCG TRS-SUC IM: CPT | Performed by: PHYSICIAN ASSISTANT

## 2023-12-26 PROCEDURE — 83036 HEMOGLOBIN GLYCOSYLATED A1C: CPT | Performed by: PHYSICIAN ASSISTANT

## 2023-12-26 NOTE — PROGRESS NOTES
"SUBJECTIVE:   Tito is a 28 year old, presenting for the following:  Establish Care (Physical - not fasting. )        12/26/2023    10:40 AM   Additional Questions   Roomed by        History of Present Illness       Reason for visit:  Physical    He eats 0-1 servings of fruits and vegetables daily.He consumes 0 sweetened beverage(s) daily.He exercises with enough effort to increase his heart rate 20 to 29 minutes per day.  He exercises with enough effort to increase his heart rate 3 or less days per week.   He is taking medications regularly.      Have you ever done Advance Care Planning? (For example, a Health Directive, POLST, or a discussion with a medical provider or your loved ones about your wishes): No, advance care planning information given to patient to review.  Advanced care planning was discussed at today's visit. Declines packet today    Social History     Tobacco Use    Smoking status: Never    Smokeless tobacco: Never   Substance Use Topics    Alcohol use: Not on file              No data to display                Last PSA: No results found for: \"PSA\"    Reviewed orders with patient. Reviewed health maintenance and updated orders accordingly - Yes  Patient Active Problem List   Diagnosis    Type 1 diabetes mellitus without complication (H)     Past Surgical History:   Procedure Laterality Date    KNEE ARTHROSCOPY W/ ACL RECONSTRUCTION AND PATELLA GRAFT Right        Social History     Tobacco Use    Smoking status: Never    Smokeless tobacco: Never   Substance Use Topics    Alcohol use: Not on file     Family History   Problem Relation Age of Onset    Cancer - colorectal No family hx of          Current Outpatient Medications   Medication Sig Dispense Refill    Continuous Blood Gluc Sensor (DEXCOM G7 SENSOR) MISC 1 each every 10 days Change sensor every 10 days 3 each 5    Continuous Blood Gluc Transmit (DEXCOM G6 TRANSMITTER) MISC Change every 3 months. 1 each 3    insulin aspart (NOVOPEN ECHO) 100 " UNIT/ML cartridge Use in In-Pen device.  Administer for all meals and snacks:  1 unit per 8 grams CHO and follow correction scale.  Average daily use is 20 units. 30 mL 3    Insulin Glargine-yfgn (SEMGLEE, YFGN,) 100 UNIT/ML SOPN Inject 14 Units Subcutaneous At Bedtime (Patient taking differently: Inject 17 Units Subcutaneous at bedtime) 15 mL 3    multivitamin w/minerals (THERA-VIT-M) tablet Take 1 tablet by mouth daily      acetone urine (KETOSTIX) test strip Use to check urine ketones in the event of glucose over 250 mg/dL and not resolving with correction insulin.  Call endocrinology if ketones measuring small or greater. 50 strip 3    Alcohol Swabs ( ALCOHOL PREP) 70 % PADS Apply 1 swab topically See Admin Instructions with injection or lancet 10 times per day or as directed. 300 each 11    blood glucose (NO BRAND SPECIFIED) lancets standard To use to test glucose level in the blood. Use to test blood sugar  4  times daily as directed. To accompany glucose monitor brands per insurance coverage. 400 each 1    blood glucose (NO BRAND SPECIFIED) test strip To use to test glucose level in the blood. Use to test blood sugar 4 times daily as directed. To accompany glucose monitor brands per insurance coverage. 100 strip 11    blood glucose monitoring (NO BRAND SPECIFIED) meter device kit Use as directed. Per insurance coverage 1 kit 0    Continuous Blood Gluc  (DEXCOM G6 ) VIRI Use to read blood sugars as per 's instructions. 1 each 0    Continuous Blood Gluc Sensor (DEXCOM G6 SENSOR) MISC CHANGE EVERY 10 DAYS 3 each 11    Glucagon (GVOKE HYPOPEN) 1 MG/0.2ML pen Inject the contents of 1 device under the skin into lower abdomen, outer thigh, or outer upper arm as needed for hypoglycemia. If no response after 15 minutes, additional 1 mg dose from a new device may be injected while waiting for emergency assistance. 1 mL 1    glucose (BD GLUCOSE) 4 g chewable tablet Take 4 tablets by mouth  every 15 minutes as needed for low blood sugar (Patient not taking: Reported on 9/22/2023) 50 tablet 0    glucose 40 % (400 mg/mL) gel 15 g every 15 minutes by mouth as needed for low blood sugar. Oral gel is preferable for conscious and able to swallow patient. (Patient not taking: Reported on 9/22/2023) 112.5 g 0    Injection Device for insulin (INPEN 100-GREY-NOVOLOG-FIASP) VIRI 1 each continuous 1 each 0    insulin aspart (NOVOLOG PEN) 100 UNIT/ML pen With snacks or supplements. DOSE:  1 units per 30 grams of carbohydrate. Only chart total amount of units given.  Do not give if pre-prandial glucose is less than 60 mg/dL. If given at mealtime, administer within 15 minutes of start of meal.Approx 15 units daily (Patient not taking: Reported on 9/22/2023) 15 mL 1    PENTIPS 32G X 4 MM miscellaneous USE FIVE DAILY AS DIRECTED 450 each 3    Sharps Container MISC Use as directed to dispose of needles, lancets and other sharps. Per Insurance coverage 1 each 0     Allergies   Allergen Reactions    Amoxicillin      1/18/21: Pt does not know what his reaction was (occurred as a child)    Cefprozil      1/18/21: Pt does not know what his reaction was (occurred as a child)         Reviewed and updated as needed this visit by clinical staff   Tobacco  Allergies  Meds  Problems  Med Hx  Surg Hx  Fam Hx            Review of Systems  CONSTITUTIONAL: NEGATIVE for fever, chills, change in weight  INTEGUMENTARY/SKIN: NEGATIVE for worrisome rashes, moles or lesions  EYES: NEGATIVE for vision changes or irritation  ENT: NEGATIVE for ear, mouth and throat problems  RESP: NEGATIVE for significant cough or SOB  CV: NEGATIVE for chest pain, palpitations or peripheral edema  GI: NEGATIVE for nausea, abdominal pain, heartburn, or change in bowel habits   male: negative for dysuria, hematuria, decreased urinary stream, erectile dysfunction, urethral discharge  MUSCULOSKELETAL: + muscle weakness- Aug and September had normal labs,  "no weight loss, output on peloton has been stable, no vision changes, no unsteadiness, no numbness or tingling, no loss of bowel or bladder control, no foot drop  NEURO: NEGATIVE for weakness, dizziness or paresthesias  PSYCHIATRIC: NEGATIVE for changes in mood or affect    OBJECTIVE:   /73   Pulse 63   Temp 98.2  F (36.8  C)   Resp 16   Ht 1.816 m (5' 11.5\")   Wt 81.6 kg (179 lb 12.8 oz)   SpO2 100%   BMI 24.73 kg/m      Physical Exam  GENERAL: healthy, alert and no distress  EYES: Eyes grossly normal to inspection, PERRL and conjunctivae and sclerae normal  HENT: ear canals and TM's normal, nose and mouth without ulcers or lesions  NECK: no adenopathy, no asymmetry, masses, or scars and thyroid normal to palpation  RESP: lungs clear to auscultation - no rales, rhonchi or wheezes  CV: regular rate and rhythm, normal S1 S2, no S3 or S4, no murmur, click or rub, no peripheral edema and peripheral pulses strong  ABDOMEN: soft, nontender, no hepatosplenomegaly, no masses and bowel sounds normal  MS: no gross musculoskeletal defects noted, no edema  SKIN: no suspicious lesions or rashes  NEURO: Normal strength and tone, mentation intact and speech normal  PSYCH: mentation appears normal, affect normal/bright    Diagnostic Test Results:  none     ASSESSMENT/PLAN:   Tito was seen today for establish care.    Diagnoses and all orders for this visit:    Routine general medical examination at a health care facility  Labs updated today.  Vaccines- Flu, COVID, Prevnar 20 given.    Type 1 diabetes mellitus without complication (H)  -     Basic metabolic panel  (Ca, Cl, CO2, Creat, Gluc, K, Na, BUN); Future  Chronic issue, historically well controlled.   Will obtain labs Endo ordered today.  UTD on eye exam, vaccines given.  Continue insulin per Endo.    Encounter for immunization  -     Pneumococcal 20 Valent Conjugate (Prevnar 20)  -     INFLUENZA VACCINE IM > 6 MONTHS VALENT IIV4 (AFLURIA/FLUZONE)  -     COVID-19 " 12+ (2023-24) (PFIZER)    Muscle fatigue  Ongoing intermittent issue, was seen in 9/23 with normal labs. No progression of illness.   Unclear etiology. Recommend monitoring, if increasing fatigue, decrease in physical output, may consider Neurology eval.      Patient has been advised of split billing requirements and indicates understanding: Yes      COUNSELING:   Reviewed preventive health counseling, as reflected in patient instructions        He reports that he has never smoked. He has never used smokeless tobacco.            Belkis Felton PA-C  [facility]

## 2023-12-26 NOTE — PROGRESS NOTES
Prior to immunization administration, verified patients identity using patient s name and date of birth. Please see Immunization Activity for additional information.     Screening Questionnaire for Adult Immunization    Are you sick today?   No   Do you have allergies to medications, food, a vaccine component or latex?   Yes   Have you ever had a serious reaction after receiving a vaccination?   No   Do you have a long-term health problem with heart, lung, kidney, or metabolic disease (e.g., diabetes), asthma, a blood disorder, no spleen, complement component deficiency, a cochlear implant, or a spinal fluid leak?  Are you on long-term aspirin therapy?   Yes   Do you have cancer, leukemia, HIV/AIDS, or any other immune system problem?   No   Do you have a parent, brother, or sister with an immune system problem?   No   In the past 3 months, have you taken medications that affect  your immune system, such as prednisone, other steroids, or anticancer drugs; drugs for the treatment of rheumatoid arthritis, Crohn s disease, or psoriasis; or have you had radiation treatments?   No   Have you had a seizure, or a brain or other nervous system problem?   No   During the past year, have you received a transfusion of blood or blood    products, or been given immune (gamma) globulin or antiviral drug?   No   For women: Are you pregnant or is there a chance you could become       pregnant during the next month?   No   Have you received any vaccinations in the past 4 weeks?   No     Immunization questionnaire was positive for at least one answer.  Notified Provider - ok to give.      Patient instructed to remain in clinic for 15 minutes afterwards, and to report any adverse reactions.     Screening performed by Carolyn Heaton MA on 12/26/2023 at 11:14 AM.

## 2024-01-16 DIAGNOSIS — E10.65 TYPE 1 DIABETES MELLITUS WITH HYPERGLYCEMIA (H): ICD-10-CM

## 2024-01-16 RX ORDER — ACYCLOVIR 400 MG/1
1 TABLET ORAL
Qty: 3 EACH | Refills: 5 | Status: SHIPPED | OUTPATIENT
Start: 2024-01-16 | End: 2024-07-25

## 2024-01-16 RX ORDER — PROCHLORPERAZINE 25 MG/1
SUPPOSITORY RECTAL
Qty: 1 EACH | Refills: 3 | Status: SHIPPED | OUTPATIENT
Start: 2024-01-16

## 2024-01-23 DIAGNOSIS — E10.10 DIABETIC KETOACIDOSIS WITHOUT COMA ASSOCIATED WITH TYPE 1 DIABETES MELLITUS (H): ICD-10-CM

## 2024-01-23 RX ORDER — INSULIN GLARGINE-YFGN 100 [IU]/ML
17 INJECTION, SOLUTION SUBCUTANEOUS AT BEDTIME
Qty: 15 ML | Refills: 3 | Status: SHIPPED | OUTPATIENT
Start: 2024-01-23

## 2024-01-23 NOTE — TELEPHONE ENCOUNTER
SEMGLEE (YFGN) 100UNIT/ML PEN   Last Written Prescription Date:  1/27/2023  Last Fill Quantity: 15 ml ,   # refills: 3  Last Office Visit : 9/22/23  Future Office visit:  6/28/24    Routing refill request to provider for review/approval because:  Insulin - refilled per clinic

## 2024-03-01 DIAGNOSIS — E10.65 TYPE 1 DIABETES MELLITUS WITH HYPERGLYCEMIA (H): Primary | ICD-10-CM

## 2024-03-01 RX ORDER — INSULIN ASPART 100 [IU]/ML
INJECTION, SOLUTION INTRAVENOUS; SUBCUTANEOUS
Qty: 15 ML | Refills: 3 | Status: SHIPPED | OUTPATIENT
Start: 2024-03-01 | End: 2024-04-15

## 2024-03-04 ENCOUNTER — TELEPHONE (OUTPATIENT)
Dept: FAMILY MEDICINE | Facility: CLINIC | Age: 29
End: 2024-03-04

## 2024-03-04 NOTE — TELEPHONE ENCOUNTER
Patient called back and I read the message to him. He stated that the last date from [facility] should be the right record of his last eye exam. Patient is going to stop in to fill out the ALISON this week sometime so that we can get all the results.

## 2024-03-04 NOTE — TELEPHONE ENCOUNTER
LMTCB See message below.  Please find out where and when last eye exam was.  Request a copy of it.  If he would rather have us obtain, I will need a signed release.    Most recent eye exam we have on file is 1-24-23.  [facility]      Belkis Felton PA-C Smith, Floridalma JOHNSON MA  Can we get his eye exam ALISON please?    Thanks,  KO

## 2024-04-09 ENCOUNTER — TRANSFERRED RECORDS (OUTPATIENT)
Dept: HEALTH INFORMATION MANAGEMENT | Facility: CLINIC | Age: 29
End: 2024-04-09

## 2024-04-09 DIAGNOSIS — E10.65 TYPE 1 DIABETES MELLITUS WITH HYPERGLYCEMIA (H): Primary | ICD-10-CM

## 2024-04-09 RX ORDER — INSULIN ASPART 100 [IU]/ML
INJECTION, SOLUTION INTRAVENOUS; SUBCUTANEOUS
Qty: 30 ML | Refills: 3 | Status: SHIPPED | OUTPATIENT
Start: 2024-04-09 | End: 2024-04-15

## 2024-04-13 ENCOUNTER — HEALTH MAINTENANCE LETTER (OUTPATIENT)
Age: 29
End: 2024-04-13

## 2024-04-15 DIAGNOSIS — E10.65 TYPE 1 DIABETES MELLITUS WITH HYPERGLYCEMIA (H): ICD-10-CM

## 2024-04-15 RX ORDER — INSULIN ASPART 100 [IU]/ML
INJECTION, SOLUTION INTRAVENOUS; SUBCUTANEOUS
Qty: 15 ML | Refills: 3 | Status: SHIPPED | OUTPATIENT
Start: 2024-04-15 | End: 2024-04-16

## 2024-04-15 RX ORDER — INSULIN ASPART 100 [IU]/ML
INJECTION, SOLUTION INTRAVENOUS; SUBCUTANEOUS
Qty: 30 ML | Refills: 3 | Status: SHIPPED | OUTPATIENT
Start: 2024-04-15 | End: 2024-04-16

## 2024-04-16 DIAGNOSIS — E10.65 TYPE 1 DIABETES MELLITUS WITH HYPERGLYCEMIA (H): ICD-10-CM

## 2024-04-16 RX ORDER — INSULIN ASPART 100 [IU]/ML
INJECTION, SOLUTION INTRAVENOUS; SUBCUTANEOUS
Qty: 15 ML | Refills: 3 | Status: SHIPPED | OUTPATIENT
Start: 2024-04-16 | End: 2024-06-28

## 2024-04-18 DIAGNOSIS — E10.10 DIABETIC KETOACIDOSIS WITHOUT COMA ASSOCIATED WITH TYPE 1 DIABETES MELLITUS (H): ICD-10-CM

## 2024-04-22 RX ORDER — PEN NEEDLE, DIABETIC 32GX 5/32"
NEEDLE, DISPOSABLE MISCELLANEOUS
Qty: 450 EACH | Refills: 3 | Status: SHIPPED | OUTPATIENT
Start: 2024-04-22

## 2024-06-03 ENCOUNTER — TELEPHONE (OUTPATIENT)
Dept: FAMILY MEDICINE | Facility: CLINIC | Age: 29
End: 2024-06-03
Payer: COMMERCIAL

## 2024-06-03 ENCOUNTER — TRANSFERRED RECORDS (OUTPATIENT)
Dept: HEALTH INFORMATION MANAGEMENT | Facility: CLINIC | Age: 29
End: 2024-06-03
Payer: COMMERCIAL

## 2024-06-03 NOTE — TELEPHONE ENCOUNTER
[facility] contacted again.  Last exam 4-9-24.  They will send a copy to our fax.      If Tito calls back please have him disregard.  Exam located.

## 2024-06-03 NOTE — TELEPHONE ENCOUNTER
Health Maintenance reviewed - patient needs eye exam. Please reach out to get records if completed.    Belkis Felton PA-C  .

## 2024-06-03 NOTE — TELEPHONE ENCOUNTER
Patient Quality Outreach    Patient is due for the following:   Diabetes -  Eye Exam      See previous phone message related to eye exam.  He signed release form and we got a copy of the same exam we already had in chart.    Next Steps:   Left message for patient to call back.  Either find out when and where last eye exam was done in the last year or advise to schedule eye exam.        Type of outreach:    Phone, left message for patient/parent to call back.      Questions for provider review:    None           Floridalma Carr MA

## 2024-06-06 ENCOUNTER — OFFICE VISIT (OUTPATIENT)
Dept: FAMILY MEDICINE | Facility: CLINIC | Age: 29
End: 2024-06-06
Payer: COMMERCIAL

## 2024-06-06 VITALS
RESPIRATION RATE: 14 BRPM | SYSTOLIC BLOOD PRESSURE: 117 MMHG | HEART RATE: 67 BPM | OXYGEN SATURATION: 99 % | WEIGHT: 173.8 LBS | BODY MASS INDEX: 23.9 KG/M2 | TEMPERATURE: 97.5 F | DIASTOLIC BLOOD PRESSURE: 59 MMHG

## 2024-06-06 DIAGNOSIS — E10.9 TYPE 1 DIABETES MELLITUS WITHOUT COMPLICATION (H): ICD-10-CM

## 2024-06-06 DIAGNOSIS — R10.13 ABDOMINAL PAIN, EPIGASTRIC: Primary | ICD-10-CM

## 2024-06-06 LAB
ALBUMIN UR-MCNC: NEGATIVE MG/DL
APPEARANCE UR: CLEAR
BILIRUB UR QL STRIP: NEGATIVE
COLOR UR AUTO: YELLOW
ERYTHROCYTE [DISTWIDTH] IN BLOOD BY AUTOMATED COUNT: 11.7 % (ref 10–15)
GLUCOSE UR STRIP-MCNC: NEGATIVE MG/DL
HBA1C MFR BLD: 5.4 % (ref 0–5.6)
HCT VFR BLD AUTO: 40.5 % (ref 40–53)
HGB BLD-MCNC: 13.8 G/DL (ref 13.3–17.7)
HGB UR QL STRIP: NEGATIVE
KETONES UR STRIP-MCNC: 15 MG/DL
LEUKOCYTE ESTERASE UR QL STRIP: NEGATIVE
MCH RBC QN AUTO: 31.1 PG (ref 26.5–33)
MCHC RBC AUTO-ENTMCNC: 34.1 G/DL (ref 31.5–36.5)
MCV RBC AUTO: 91 FL (ref 78–100)
NITRATE UR QL: NEGATIVE
PH UR STRIP: 5.5 [PH] (ref 5–8)
PLATELET # BLD AUTO: 240 10E3/UL (ref 150–450)
RBC # BLD AUTO: 4.44 10E6/UL (ref 4.4–5.9)
SP GR UR STRIP: 1.01 (ref 1–1.03)
UROBILINOGEN UR STRIP-ACNC: 0.2 E.U./DL
WBC # BLD AUTO: 6.3 10E3/UL (ref 4–11)

## 2024-06-06 PROCEDURE — 99214 OFFICE O/P EST MOD 30 MIN: CPT | Performed by: NURSE PRACTITIONER

## 2024-06-06 PROCEDURE — 83036 HEMOGLOBIN GLYCOSYLATED A1C: CPT | Performed by: NURSE PRACTITIONER

## 2024-06-06 PROCEDURE — 85027 COMPLETE CBC AUTOMATED: CPT | Performed by: NURSE PRACTITIONER

## 2024-06-06 PROCEDURE — 36415 COLL VENOUS BLD VENIPUNCTURE: CPT | Performed by: NURSE PRACTITIONER

## 2024-06-06 PROCEDURE — 83690 ASSAY OF LIPASE: CPT | Performed by: NURSE PRACTITIONER

## 2024-06-06 PROCEDURE — 86364 TISS TRNSGLTMNASE EA IG CLAS: CPT | Performed by: NURSE PRACTITIONER

## 2024-06-06 PROCEDURE — 81003 URINALYSIS AUTO W/O SCOPE: CPT | Performed by: NURSE PRACTITIONER

## 2024-06-06 PROCEDURE — 80053 COMPREHEN METABOLIC PANEL: CPT | Performed by: NURSE PRACTITIONER

## 2024-06-06 ASSESSMENT — PAIN SCALES - GENERAL: PAINLEVEL: NO PAIN (1)

## 2024-06-06 NOTE — PROGRESS NOTES
"Assessment and Plan:     Abdominal pain, epigastric  Differentials includes GERD, gastritis, pancreatitis, hepatobiliary pathology, appendicitis, epiploic appendagitis or mesenteric adenitis, UTI, ureterolithiasis.  Further plans pending lab results.  If white blood count is elevated, will pursue CT scan for further evaluation.  Patient does consume 8-15 beers on the weekend which puts him at risk for pancreatitis.  If lipase is elevated, will pursue imaging as well.  Will start omeprazole 20 mg daily to assist with esophageal reflux, gastritis, gastric ulcer.  If no improvement in symptoms, may consider referral to gastroenterology.  - CBC with platelets  - Comprehensive metabolic panel (BMP + Alb, Alk Phos, ALT, AST, Total. Bili, TP)  - Lipase  - UA Macroscopic with reflex to Microscopic and Culture  - Tissue transglutaminase wiley IgA and IgG  - omeprazole (PRILOSEC) 20 MG DR capsule  Dispense: 30 capsule; Refill: 1    Type 1 diabetes mellitus without complication (H)  Will check A1c today.  He is followed by endocrinology.  - HEMOGLOBIN A1C        Subjective:     Louie is a 29 year old male presenting to the clinic for concerns for abdominal pain.  Patient has been experiencing mid epigastric abdominal discomfort for 3 weeks.  He complains of constant nausea and a \"nervous \"feeling.  Discomfort is a dull ache.  He has not vomited.  He denies constipation or diarrhea.  He has not had any blood or mucus in his stool.  His last bowel movement was this morning and was normal.  He denies dysuria, hematuria, urinary urgency, urinary frequency, low back pain, fever.  He has not tried any over-the-counter products for his symptoms.  He has type 1 diabetes and has noticed that his blood sugars have been slightly more elevated in the morning.  His blood sugars typically range around 120, but his blood sugar was 190 this morning.  He does consume 8-15 beers or seltzers on the weekend.  He is concerned he may have " celiac.    Reviewof Systems: A complete 14 point review of systems was obtained and is negative or as stated in the history of present illness.    Social History     Socioeconomic History    Marital status: Single     Spouse name: Not on file    Number of children: Not on file    Years of education: Not on file    Highest education level: Not on file   Occupational History    Not on file   Tobacco Use    Smoking status: Never    Smokeless tobacco: Never   Vaping Use    Vaping status: Never Used   Substance and Sexual Activity    Alcohol use: Not on file    Drug use: Not on file    Sexual activity: Not on file   Other Topics Concern    Not on file   Social History Narrative    Not on file     Social Determinants of Health     Financial Resource Strain: Low Risk  (12/19/2023)    Financial Resource Strain     Within the past 12 months, have you or your family members you live with been unable to get utilities (heat, electricity) when it was really needed?: No   Food Insecurity: Low Risk  (12/19/2023)    Food Insecurity     Within the past 12 months, did you worry that your food would run out before you got money to buy more?: No     Within the past 12 months, did the food you bought just not last and you didn t have money to get more?: No   Transportation Needs: Low Risk  (12/19/2023)    Transportation Needs     Within the past 12 months, has lack of transportation kept you from medical appointments, getting your medicines, non-medical meetings or appointments, work, or from getting things that you need?: No   Physical Activity: Not on file   Stress: Not on file   Social Connections: Unknown (1/17/2022)    Received from [facility], [facility]    Social Connections     Frequency of Communication with Friends and Family: Not on file   Interpersonal Safety: Low Risk  (12/26/2023)    Interpersonal Safety     Do you feel physically and emotionally safe  where you currently live?: Yes     Within the past 12 months, have you been hit, slapped, kicked or otherwise physically hurt by someone?: No     Within the past 12 months, have you been humiliated or emotionally abused in other ways by your partner or ex-partner?: No   Housing Stability: Low Risk  (12/19/2023)    Housing Stability     Do you have housing? : Yes     Are you worried about losing your housing?: No       Active Ambulatory Problems     Diagnosis Date Noted    Type 1 diabetes mellitus without complication (H) 12/26/2023     Resolved Ambulatory Problems     Diagnosis Date Noted    No Resolved Ambulatory Problems     No Additional Past Medical History       Family History   Problem Relation Age of Onset    Cancer - colorectal No family hx of        Objective:     /59 (BP Location: Left arm, Patient Position: Sitting, Cuff Size: Adult Regular)   Pulse 67   Temp 97.5  F (36.4  C) (Temporal)   Resp 14   Wt 78.8 kg (173 lb 12.8 oz)   SpO2 99%   BMI 23.90 kg/m      Patient is alert, in no obvious distress.   Skin: Warm, dry.    Lungs:  Clear to auscultation. Respirations even and unlabored.  No wheezing or rales noted.   Heart:  Regular rate and rhythm.  No murmurs, S3, S4, gallops, or rubs.    Abdomen: Soft, nontender.  No organomegaly. Bowel sounds normoactive. No guarding or masses noted. CVA tenderness is negative bilaterally.         Answers submitted by the patient for this visit:  General Questionnaire (Submitted on 6/5/2024)  Chief Complaint: Chronic problems general questions HPI Form  How many servings of fruits and vegetables do you eat daily?: 2-3  On average, how many sweetened beverages do you drink each day (Examples: soda, juice, sweet tea, etc.  Do NOT count diet or artificially sweetened beverages)?: 0  How many minutes a day do you exercise enough to make your heart beat faster?: 20 to 29  How many days a week do you exercise enough to make your heart beat faster?: 3 or less  How  many days per week do you miss taking your medication?: 0  General Concern (Submitted on 6/5/2024)  Chief Complaint: Chronic problems general questions HPI Form  What is the reason for your visit today?: Abdominal Pain  When did your symptoms begin?: 3-4 weeks ago  What are your symptoms?: Pain, throwing-up sensation, higher blood sugar, trouble sleeping  How would you describe these symptoms?: Moderate  Are your symptoms:: Staying the same  Have you had these symptoms before?: No  Is there anything that makes you feel worse?: No  Is there anything that makes you feel better?: No

## 2024-06-07 LAB
ALBUMIN SERPL BCG-MCNC: 4.2 G/DL (ref 3.5–5.2)
ALP SERPL-CCNC: 52 U/L (ref 40–150)
ALT SERPL W P-5'-P-CCNC: 45 U/L (ref 0–70)
ANION GAP SERPL CALCULATED.3IONS-SCNC: 10 MMOL/L (ref 7–15)
AST SERPL W P-5'-P-CCNC: 39 U/L (ref 0–45)
BILIRUB SERPL-MCNC: 0.5 MG/DL
BUN SERPL-MCNC: 22.3 MG/DL (ref 6–20)
CALCIUM SERPL-MCNC: 8.9 MG/DL (ref 8.6–10)
CHLORIDE SERPL-SCNC: 106 MMOL/L (ref 98–107)
CREAT SERPL-MCNC: 0.73 MG/DL (ref 0.67–1.17)
DEPRECATED HCO3 PLAS-SCNC: 23 MMOL/L (ref 22–29)
EGFRCR SERPLBLD CKD-EPI 2021: >90 ML/MIN/1.73M2
GLUCOSE SERPL-MCNC: 72 MG/DL (ref 70–99)
LIPASE SERPL-CCNC: 16 U/L (ref 13–60)
POTASSIUM SERPL-SCNC: 4.1 MMOL/L (ref 3.4–5.3)
PROT SERPL-MCNC: 6 G/DL (ref 6.4–8.3)
SODIUM SERPL-SCNC: 139 MMOL/L (ref 135–145)

## 2024-06-10 LAB
TTG IGA SER-ACNC: <0.2 U/ML
TTG IGG SER-ACNC: <0.6 U/ML

## 2024-06-17 NOTE — PROGRESS NOTES
Outcome for 06/17/24 8:35 AM: Data uploaded on [employer]  Leonie Martinez MA  Outcome for 06/26/24 8:33 AM: Data obtained via [employer] website  Mehreen Escalante LPN

## 2024-06-28 ENCOUNTER — VIRTUAL VISIT (OUTPATIENT)
Dept: ENDOCRINOLOGY | Facility: CLINIC | Age: 29
End: 2024-06-28
Payer: COMMERCIAL

## 2024-06-28 DIAGNOSIS — E10.9 TYPE 1 DIABETES MELLITUS WITHOUT COMPLICATION (H): ICD-10-CM

## 2024-06-28 PROCEDURE — 99214 OFFICE O/P EST MOD 30 MIN: CPT | Mod: 95 | Performed by: INTERNAL MEDICINE

## 2024-06-28 PROCEDURE — G2211 COMPLEX E/M VISIT ADD ON: HCPCS | Mod: 95 | Performed by: INTERNAL MEDICINE

## 2024-06-28 RX ORDER — INSULIN PEN,REUSABLE,BT LISPRO
1 INSULIN PEN (EA) SUBCUTANEOUS CONTINUOUS
Qty: 2 EACH | Refills: 1 | Status: SHIPPED | OUTPATIENT
Start: 2024-06-28

## 2024-06-28 RX ORDER — INSULIN ASPART 100 [IU]/ML
INJECTION, SOLUTION INTRAVENOUS; SUBCUTANEOUS
Qty: 15 ML | Refills: 3 | Status: SHIPPED | OUTPATIENT
Start: 2024-06-28

## 2024-06-28 NOTE — LETTER
6/28/2024       RE: Louie Alicia  [address]  [place] [address]     Dear Colleague,    Thank you for referring your patient, Louie Alicia, to the [facility] [place] [facility]. Please see a copy of my visit note below.    Outcome for 06/17/24 8:35 AM: Data uploaded on [employer] and [employer]  Leonie Martinez MA  Outcome for 06/26/24 8:33 AM: Data obtained via [employer] and [employer] website  Mehreen Escalante LPN                          Endocrinology Note         Louie is a 29 year old male presents today for type 1 diabetes    HPI  Louie is a 29 year old male previously healthy presents today for follow up type 1 diabetes    He was admitted for newly diagnose type 1 diabetes after he was found to have random glucose of 700s in January 2022. His initial symptoms were significant weight loss, increased fatigue and increased urination. He found to have DKA and was started IVF and insulin drip. A1c was 11.6, PALOMO Ab was positive >250. TSH 2.64. He was subsequently transitioned to subcutaneous insulin later. He met with CDE during hospitalization and feels confident with carbohydrate counting. In retrospect, he had 2 months of 20 lbs weight loss.     Interim history  Last seen 9/2023.     In general, his BG are mostly in range.  He did have some low in the mid afternoon especially when he went golfing.  Reviewed Dexcom and Inpen in the past 2 weeks.    Current medication regimen  Glargine (Semglee) -- 18 units at night  continue Novolog 1 unit per 6.5 gram carb with meal   Novolog correction scale  Before meals---  For Pre-Meal  - 164 give 1 unit.   For Pre-Meal  - 189 give 2 units.   For Pre-Meal  - 214 give 3 units.   For Pre-Meal  - 239 give 4 units.   For Pre-Meal  - 264 give 5 units.   For Pre-Meal  - 289 give 6 units.   For Pre-Meal  - 314 give 7 units.   For Pre-Meal  -  339 give 8 units.   For Pre-Meal  - 364 give 9 units.   For Pre-Meal BG greater than or equal to 365 give 10 units     Before bedtime---  For  - 224 give 1 units.   For  - 249 give 2 units.   For  - 274 give 3 units.   For  - 299 give 4 units.   For  - 324 give 5 units.   For  - 349 give 6 units.   For BG greater than or equal to 350 give 7 units.     Exercise -- 20-30 minutes -- Peloton bike about 2-3 times per week and golfing    He denied blurred vision, SOB, chest pain, palpitation, abdominal pain, nausea, vomiting, skin changes or hair loss.    DM complications:  Retinopathy: 6/2024 at [facility] -- normal   Nephropathy: normal 12/2023  Neuropathy: notices some cold feet easily but no tingling or numbness    Past Medical History  Type 1 diabetes    Allergies  Allergies   Allergen Reactions     Amoxicillin      1/18/21: Pt does not know what his reaction was (occurred as a child)     Cefprozil      1/18/21: Pt does not know what his reaction was (occurred as a child)       Medications  Current Outpatient Medications   Medication Sig Dispense Refill     acetone urine (KETOSTIX) test strip Use to check urine ketones in the event of glucose over 250 mg/dL and not resolving with correction insulin.  Call endocrinology if ketones measuring small or greater. 50 strip 3     Alcohol Swabs (SM ALCOHOL PREP) 70 % PADS Apply 1 swab topically See Admin Instructions with injection or lancet 10 times per day or as directed. 300 each 11     blood glucose (NO BRAND SPECIFIED) lancets standard To use to test glucose level in the blood. Use to test blood sugar  4  times daily as directed. To accompany glucose monitor brands per insurance coverage. 400 each 1     blood glucose (NO BRAND SPECIFIED) test strip To use to test glucose level in the blood. Use to test blood sugar 4 times daily as directed. To accompany glucose monitor brands per insurance coverage. 100 strip 11     blood  glucose monitoring (NO BRAND SPECIFIED) meter device kit Use as directed. Per insurance coverage 1 kit 0     Continuous Blood Gluc  (DEXCOM G6 ) VIRI Use to read blood sugars as per 's instructions. 1 each 0     Continuous Blood Gluc Sensor (DEXCOM G6 SENSOR) MISC CHANGE EVERY 10 DAYS 3 each 11     Continuous Blood Gluc Sensor (DEXCOM G7 SENSOR) MISC 1 each every 10 days Change sensor every 10 days 3 each 5     Continuous Blood Gluc Transmit (DEXCOM G6 TRANSMITTER) MISC Change every 3 months. 1 each 3     Glucagon (GVOKE HYPOPEN) 1 MG/0.2ML pen Inject the contents of 1 device under the skin into lower abdomen, outer thigh, or outer upper arm as needed for hypoglycemia. If no response after 15 minutes, additional 1 mg dose from a new device may be injected while waiting for emergency assistance. 1 mL 1     glucose (BD GLUCOSE) 4 g chewable tablet Take 4 tablets by mouth every 15 minutes as needed for low blood sugar 50 tablet 0     glucose 40 % (400 mg/mL) gel 15 g every 15 minutes by mouth as needed for low blood sugar. Oral gel is preferable for conscious and able to swallow patient. 112.5 g 0     Injection Device for insulin (INPEN 100-GREY-NOVOLOG-FIASP) VIRI 1 each continuous 1 each 0     insulin aspart (NOVOPEN ECHO) 100 UNIT/ML cartridge Use in In-Pen device.  Administer for all meals and snacks:  1 unit per 8 grams CHO and follow correction scale.  Average daily use is 20 units. 30 mL 3     Insulin Glargine-yfgn (SEMGLEE, YFGN,) 100 UNIT/ML SOPN Inject 17 Units Subcutaneous at bedtime 15 mL 3     multivitamin w/minerals (THERA-VIT-M) tablet Take 1 tablet by mouth daily       NOVOLOG PENFILL 100 UNIT/ML soln Use in In-Pen device. Administer for all meals and snacks: 1 unit per 8 grams CHO and follow correction scale 1 unit per 24 above 140 mg/dl. Average daily use is 20 units. 15 mL 3     omeprazole (PRILOSEC) 20 MG DR capsule Take 1 capsule (20 mg) by mouth daily 30 capsule 1      PENTIPS 32G X 4 MM miscellaneous USE FIVE DAILY AS DIRECTED 450 each 3     Sharps Container MISC Use as directed to dispose of needles, lancets and other sharps. Per Insurance coverage 1 each 0     Family History  family history is not on file.   Father and mother are healthy  Denied family hx of type 1 or type 2 diabetes or autoimmune diseases    Social History  Social History     Tobacco Use     Smoking status: Never     Smokeless tobacco: Never   Vaping Use     Vaping status: Never Used   drink alcohol socially -- usually beer  No smoking  No drug  Work --    Single, no children    ROS  10 points ROS were negative otherwise mentioned in HPI    Physical Exam  Limited due to virtual visit  Constitutional: no distress, comfortable, pleasant   Eyes: anicteric  Psychological: appropriate mood       RESULTS  I have personally reviewed labs and images. I also reviewed labs with patient and discussed the result and plan of care.  Lab Results   Component Value Date    A1C 5.4 06/06/2024    A1C 5.1 12/26/2023    A1C 5.4 02/01/2023    A1C 11.6 01/18/2022      Latest Ref Rng 2/1/2023  8:04 AM   ENDO DIABETES     Cholesterol <200 mg/dL 146    LDL Cholesterol Calculated <=100 mg/dL 80    HDL Cholesterol >=40 mg/dL 54    Non HDL Cholesterol <130 mg/dL 92    Triglycerides <150 mg/dL 62    TRIG (EXT) <150 mg/dL 62    Creatinine 0.67 - 1.17 mg/dL 0.97       Latest Ref Rng 6/6/2024  2:30 PM   ENDO DIABETES     Creatinine 0.67 - 1.17 mg/dL 0.73       Latest Ref Rng 12/26/2023  11:24 AM   ENDO DIABETES     Albumin Urine mg/L mg/L <12.0    Albumin Urine mg/g Cr  --      ASSESSMENT:    Louie is a 29 year old male previously healthy presents today for type 1 diabetes    1) type 1 diabetes: Dx after presented with 20 lbs weight loss, increased urination and fatigue and found to have DKA. He is quite knowledgeble about type 1 diabetes and management including carb counting and hypoglycemic rescue.  - currently using Dexcom  and InPen. -- BGs are in range with TIR 80%. Did have some low if he was active in the afternoon.  - continue  Glargine (Semglee) -- 18 units at night  - continue Novolog 1 unit per 6.5 gram carb with meal -- ask him to try 1 unit per 7.5-8 gram for lunch  - discuss insulin pump - he is not interested in the pump at this time    2) DM complications:  Retinopathy: eye exam 6/2024 at [facility]   Nephropathy: normal 12/2023  Neuropathy: none, will test at the next in-person visit    PLAN:   Continue Glargine (Semglee) -- 18 units at night  Continue Novolog 1 unit per 6.5 gram carb with meal -- ask him to try 1 unit per 7.5-8 gram for lunch  Novolog correction scale  Before meals---  For Pre-Meal  - 164 give 1 unit.   For Pre-Meal  - 189 give 2 units.   For Pre-Meal  - 214 give 3 units.   For Pre-Meal  - 239 give 4 units.   For Pre-Meal  - 264 give 5 units.   For Pre-Meal  - 289 give 6 units.   For Pre-Meal  - 314 give 7 units.   For Pre-Meal  - 339 give 8 units.   For Pre-Meal  - 364 give 9 units.   For Pre-Meal BG greater than or equal to 365 give 10 units     Before bedtime---  For  - 224 give 1 units.   For  - 249 give 2 units.   For  - 274 give 3 units.   For  - 299 give 4 units.   For  - 324 give 5 units.   For  - 349 give 6 units.   For BG greater than or equal to 350 give 7 units.     Return in 6 months with MD or PA with labs    Joined the call at 6/28/2024, 8:16:13 am.  Left the call at 6/28/2024, 8:26:06 am.  You were on the call for 9 minutes 52 seconds .      Laurence Menendez MD  Division of Diabetes and Endocrinology  Department of Medicine      Again, thank you for allowing me to participate in the care of your patient.      Sincerely,    Laurence Menendez MD

## 2024-06-28 NOTE — NURSING NOTE
Is the patient currently in the state of [place]? YES    Visit mode:VIDEO    If the visit is dropped, the patient can be reconnected by: VIDEO VISIT: Send to e-mail at: [email or web address]    Will anyone else be joining the visit? NO  (If patient encounters technical issues they should call [phone]    How would you like to obtain your AVS? MyChart    Are changes needed to the allergy or medication list? Pt stated no med changes    Are refills needed on medications prescribed by this physician? NO    Reason for visit: RECHECK (6 mo )    Vandana WOODS

## 2024-06-28 NOTE — PROGRESS NOTES
Endocrinology Note         Louie is a 29 year old male presents today for type 1 diabetes    HPI  Louie is a 29 year old male previously healthy presents today for follow up type 1 diabetes    He was admitted for newly diagnose type 1 diabetes after he was found to have random glucose of 700s in January 2022. His initial symptoms were significant weight loss, increased fatigue and increased urination. He found to have DKA and was started IVF and insulin drip. A1c was 11.6, PALOMO Ab was positive >250. TSH 2.64. He was subsequently transitioned to subcutaneous insulin later. He met with CDE during hospitalization and feels confident with carbohydrate counting. In retrospect, he had 2 months of 20 lbs weight loss.     Interim history  Last seen 9/2023.     In general, his BG are mostly in range.  He did have some low in the mid afternoon especially when he went golfing.  Reviewed Dexcom and Inpen in the past 2 weeks.    Current medication regimen  Glargine (Semglee) -- 18 units at night  continue Novolog 1 unit per 6.5 gram carb with meal   Novolog correction scale  Before meals---  For Pre-Meal  - 164 give 1 unit.   For Pre-Meal  - 189 give 2 units.   For Pre-Meal  - 214 give 3 units.   For Pre-Meal  - 239 give 4 units.   For Pre-Meal  - 264 give 5 units.   For Pre-Meal  - 289 give 6 units.   For Pre-Meal  - 314 give 7 units.   For Pre-Meal  - 339 give 8 units.   For Pre-Meal  - 364 give 9 units.   For Pre-Meal BG greater than or equal to 365 give 10 units     Before bedtime---  For  - 224 give 1 units.   For  - 249 give 2 units.   For  - 274 give 3 units.   For  - 299 give 4 units.   For  - 324 give 5 units.   For  - 349 give 6 units.   For BG greater than or equal to 350 give 7 units.     Exercise -- 20-30 minutes -- Peloton bike about 2-3 times per week and golfing    He denied blurred vision, SOB, chest pain,  palpitation, abdominal pain, nausea, vomiting, skin changes or hair loss.    DM complications:  Retinopathy: 6/2024 at [facility] -- normal   Nephropathy: normal 12/2023  Neuropathy: notices some cold feet easily but no tingling or numbness    Past Medical History  Type 1 diabetes    Allergies  Allergies   Allergen Reactions    Amoxicillin      1/18/21: Pt does not know what his reaction was (occurred as a child)    Cefprozil      1/18/21: Pt does not know what his reaction was (occurred as a child)       Medications  Current Outpatient Medications   Medication Sig Dispense Refill    acetone urine (KETOSTIX) test strip Use to check urine ketones in the event of glucose over 250 mg/dL and not resolving with correction insulin.  Call endocrinology if ketones measuring small or greater. 50 strip 3    Alcohol Swabs (SM ALCOHOL PREP) 70 % PADS Apply 1 swab topically See Admin Instructions with injection or lancet 10 times per day or as directed. 300 each 11    blood glucose (NO BRAND SPECIFIED) lancets standard To use to test glucose level in the blood. Use to test blood sugar  4  times daily as directed. To accompany glucose monitor brands per insurance coverage. 400 each 1    blood glucose (NO BRAND SPECIFIED) test strip To use to test glucose level in the blood. Use to test blood sugar 4 times daily as directed. To accompany glucose monitor brands per insurance coverage. 100 strip 11    blood glucose monitoring (NO BRAND SPECIFIED) meter device kit Use as directed. Per insurance coverage 1 kit 0    Continuous Blood Gluc  (DEXCOM G6 ) VIRI Use to read blood sugars as per 's instructions. 1 each 0    Continuous Blood Gluc Sensor (DEXCOM G6 SENSOR) MISC CHANGE EVERY 10 DAYS 3 each 11    Continuous Blood Gluc Sensor (DEXCOM G7 SENSOR) MISC 1 each every 10 days Change sensor every 10 days 3 each 5    Continuous Blood Gluc Transmit (DEXCOM G6 TRANSMITTER) MISC Change every 3 months. 1 each 3     Glucagon (GVOKE HYPOPEN) 1 MG/0.2ML pen Inject the contents of 1 device under the skin into lower abdomen, outer thigh, or outer upper arm as needed for hypoglycemia. If no response after 15 minutes, additional 1 mg dose from a new device may be injected while waiting for emergency assistance. 1 mL 1    glucose (BD GLUCOSE) 4 g chewable tablet Take 4 tablets by mouth every 15 minutes as needed for low blood sugar 50 tablet 0    glucose 40 % (400 mg/mL) gel 15 g every 15 minutes by mouth as needed for low blood sugar. Oral gel is preferable for conscious and able to swallow patient. 112.5 g 0    Injection Device for insulin (INPEN 100-GREY-NOVOLOG-FIASP) VIRI 1 each continuous 1 each 0    insulin aspart (NOVOPEN ECHO) 100 UNIT/ML cartridge Use in In-Pen device.  Administer for all meals and snacks:  1 unit per 8 grams CHO and follow correction scale.  Average daily use is 20 units. 30 mL 3    Insulin Glargine-yfgn (SEMGLEE, YFGN,) 100 UNIT/ML SOPN Inject 17 Units Subcutaneous at bedtime 15 mL 3    multivitamin w/minerals (THERA-VIT-M) tablet Take 1 tablet by mouth daily      NOVOLOG PENFILL 100 UNIT/ML soln Use in In-Pen device. Administer for all meals and snacks: 1 unit per 8 grams CHO and follow correction scale 1 unit per 24 above 140 mg/dl. Average daily use is 20 units. 15 mL 3    omeprazole (PRILOSEC) 20 MG DR capsule Take 1 capsule (20 mg) by mouth daily 30 capsule 1    PENTIPS 32G X 4 MM miscellaneous USE FIVE DAILY AS DIRECTED 450 each 3    Sharps Container MISC Use as directed to dispose of needles, lancets and other sharps. Per Insurance coverage 1 each 0     Family History  family history is not on file.   Father and mother are healthy  Denied family hx of type 1 or type 2 diabetes or autoimmune diseases    Social History  Social History     Tobacco Use    Smoking status: Never    Smokeless tobacco: Never   Vaping Use    Vaping status: Never Used   drink alcohol socially -- usually beer  No smoking  No  drug  Work --    Single, no children    ROS  10 points ROS were negative otherwise mentioned in HPI    Physical Exam  Limited due to virtual visit  Constitutional: no distress, comfortable, pleasant   Eyes: anicteric  Psychological: appropriate mood       RESULTS  I have personally reviewed labs and images. I also reviewed labs with patient and discussed the result and plan of care.  Lab Results   Component Value Date    A1C 5.4 06/06/2024    A1C 5.1 12/26/2023    A1C 5.4 02/01/2023    A1C 11.6 01/18/2022      Latest Ref Rng 2/1/2023  8:04 AM   ENDO DIABETES     Cholesterol <200 mg/dL 146    LDL Cholesterol Calculated <=100 mg/dL 80    HDL Cholesterol >=40 mg/dL 54    Non HDL Cholesterol <130 mg/dL 92    Triglycerides <150 mg/dL 62    TRIG (EXT) <150 mg/dL 62    Creatinine 0.67 - 1.17 mg/dL 0.97       Latest Ref Rng 6/6/2024  2:30 PM   ENDO DIABETES     Creatinine 0.67 - 1.17 mg/dL 0.73       Latest Ref Rng 12/26/2023  11:24 AM   ENDO DIABETES     Albumin Urine mg/L mg/L <12.0    Albumin Urine mg/g Cr  --      ASSESSMENT:    Louie is a 29 year old male previously healthy presents today for type 1 diabetes    1) type 1 diabetes: Dx after presented with 20 lbs weight loss, increased urination and fatigue and found to have DKA. He is quite knowledgeble about type 1 diabetes and management including carb counting and hypoglycemic rescue.  - currently using Dexcom and InPen. -- BGs are in range with TIR 80%. Did have some low if he was active in the afternoon.  - continue  Glargine (Semglee) -- 18 units at night  - continue Novolog 1 unit per 6.5 gram carb with meal -- ask him to try 1 unit per 7.5-8 gram for lunch  - discuss insulin pump - he is not interested in the pump at this time    2) DM complications:  Retinopathy: eye exam 6/2024 at [facility]   Nephropathy: normal 12/2023  Neuropathy: none, will test at the next in-person visit    PLAN:   Continue Glargine (Semglee) -- 18 units at night  Continue  Novolog 1 unit per 6.5 gram carb with meal -- ask him to try 1 unit per 7.5-8 gram for lunch  Novolog correction scale  Before meals---  For Pre-Meal  - 164 give 1 unit.   For Pre-Meal  - 189 give 2 units.   For Pre-Meal  - 214 give 3 units.   For Pre-Meal  - 239 give 4 units.   For Pre-Meal  - 264 give 5 units.   For Pre-Meal  - 289 give 6 units.   For Pre-Meal  - 314 give 7 units.   For Pre-Meal  - 339 give 8 units.   For Pre-Meal  - 364 give 9 units.   For Pre-Meal BG greater than or equal to 365 give 10 units     Before bedtime---  For  - 224 give 1 units.   For  - 249 give 2 units.   For  - 274 give 3 units.   For  - 299 give 4 units.   For  - 324 give 5 units.   For  - 349 give 6 units.   For BG greater than or equal to 350 give 7 units.     Return in 6 months with MD or PA with labs    Joined the call at 6/28/2024, 8:16:13 am.  Left the call at 6/28/2024, 8:26:06 am.  You were on the call for 9 minutes 52 seconds .      Laurence Menendez MD  Division of Diabetes and Endocrinology  Department of Medicine

## 2024-07-15 ENCOUNTER — TELEPHONE (OUTPATIENT)
Dept: ENDOCRINOLOGY | Facility: CLINIC | Age: 29
End: 2024-07-15
Payer: COMMERCIAL

## 2024-07-15 NOTE — TELEPHONE ENCOUNTER
Left Voicemail (1st Attempt) and Sent Mychart (1st Attempt) for the patient to call back and schedule the following:    Appointment type: Return diabetes  Provider: Laurence  Return date: 6 months (around 12/27)  Specialty phone number: [phone]  Additional appointment(s) needed: NA  Additonal Notes: NA

## 2024-07-23 ENCOUNTER — TELEPHONE (OUTPATIENT)
Dept: ENDOCRINOLOGY | Facility: CLINIC | Age: 29
End: 2024-07-23
Payer: COMMERCIAL

## 2024-07-23 DIAGNOSIS — E10.65 TYPE 1 DIABETES MELLITUS WITH HYPERGLYCEMIA (H): ICD-10-CM

## 2024-07-23 NOTE — TELEPHONE ENCOUNTER
Left Voicemail (1st Attempt) and Sent Mychart (1st Attempt) for the patient to call back and schedule the following:    Appointment type: Return diabetes  Provider: Laurence  Return date: 6 months (around 12/28)  Specialty phone number: [phone]  Additional appointment(s) needed: NA  Additonal Notes: NA

## 2024-07-25 RX ORDER — ACYCLOVIR 400 MG/1
TABLET ORAL
Qty: 6 EACH | Refills: 5 | Status: SHIPPED | OUTPATIENT
Start: 2024-07-25

## 2024-11-09 ENCOUNTER — HEALTH MAINTENANCE LETTER (OUTPATIENT)
Age: 29
End: 2024-11-09

## 2024-12-11 SDOH — HEALTH STABILITY: PHYSICAL HEALTH: ON AVERAGE, HOW MANY MINUTES DO YOU ENGAGE IN EXERCISE AT THIS LEVEL?: 30 MIN

## 2024-12-11 SDOH — HEALTH STABILITY: PHYSICAL HEALTH: ON AVERAGE, HOW MANY DAYS PER WEEK DO YOU ENGAGE IN MODERATE TO STRENUOUS EXERCISE (LIKE A BRISK WALK)?: 3 DAYS

## 2024-12-11 ASSESSMENT — SOCIAL DETERMINANTS OF HEALTH (SDOH): HOW OFTEN DO YOU GET TOGETHER WITH FRIENDS OR RELATIVES?: TWICE A WEEK

## 2024-12-16 ENCOUNTER — OFFICE VISIT (OUTPATIENT)
Dept: FAMILY MEDICINE | Facility: CLINIC | Age: 29
End: 2024-12-16
Payer: COMMERCIAL

## 2024-12-16 VITALS
HEIGHT: 71 IN | SYSTOLIC BLOOD PRESSURE: 114 MMHG | WEIGHT: 176 LBS | BODY MASS INDEX: 24.64 KG/M2 | HEART RATE: 63 BPM | RESPIRATION RATE: 16 BRPM | DIASTOLIC BLOOD PRESSURE: 72 MMHG | OXYGEN SATURATION: 100 % | TEMPERATURE: 97.2 F

## 2024-12-16 DIAGNOSIS — E10.9 TYPE 1 DIABETES MELLITUS WITHOUT COMPLICATION (H): ICD-10-CM

## 2024-12-16 DIAGNOSIS — Z00.00 ROUTINE GENERAL MEDICAL EXAMINATION AT A HEALTH CARE FACILITY: Primary | ICD-10-CM

## 2024-12-16 PROBLEM — E10.65 TYPE 1 DIABETES MELLITUS WITH HYPERGLYCEMIA (H): Status: ACTIVE | Noted: 2024-12-16

## 2024-12-16 PROBLEM — E10.65 TYPE 1 DIABETES MELLITUS WITH HYPERGLYCEMIA (H): Status: RESOLVED | Noted: 2024-12-16 | Resolved: 2024-12-16

## 2024-12-16 LAB
ERYTHROCYTE [DISTWIDTH] IN BLOOD BY AUTOMATED COUNT: 11.7 % (ref 10–15)
EST. AVERAGE GLUCOSE BLD GHB EST-MCNC: 120 MG/DL
HBA1C MFR BLD: 5.8 %
HCT VFR BLD AUTO: 45.4 % (ref 40–53)
HGB BLD-MCNC: 15.4 G/DL (ref 13.3–17.7)
MCH RBC QN AUTO: 30.6 PG (ref 26.5–33)
MCHC RBC AUTO-ENTMCNC: 33.9 G/DL (ref 31.5–36.5)
MCV RBC AUTO: 90 FL (ref 78–100)
PLATELET # BLD AUTO: 241 10E3/UL (ref 150–450)
RBC # BLD AUTO: 5.04 10E6/UL (ref 4.4–5.9)
WBC # BLD AUTO: 5.7 10E3/UL (ref 4–11)

## 2024-12-16 PROCEDURE — 90656 IIV3 VACC NO PRSV 0.5 ML IM: CPT | Performed by: PHYSICIAN ASSISTANT

## 2024-12-16 PROCEDURE — 85027 COMPLETE CBC AUTOMATED: CPT | Performed by: PHYSICIAN ASSISTANT

## 2024-12-16 PROCEDURE — 99395 PREV VISIT EST AGE 18-39: CPT | Mod: 25 | Performed by: PHYSICIAN ASSISTANT

## 2024-12-16 PROCEDURE — 84443 ASSAY THYROID STIM HORMONE: CPT | Performed by: PHYSICIAN ASSISTANT

## 2024-12-16 PROCEDURE — 90471 IMMUNIZATION ADMIN: CPT | Performed by: PHYSICIAN ASSISTANT

## 2024-12-16 PROCEDURE — 80053 COMPREHEN METABOLIC PANEL: CPT | Performed by: PHYSICIAN ASSISTANT

## 2024-12-16 PROCEDURE — 80061 LIPID PANEL: CPT | Performed by: PHYSICIAN ASSISTANT

## 2024-12-16 PROCEDURE — 36415 COLL VENOUS BLD VENIPUNCTURE: CPT | Performed by: PHYSICIAN ASSISTANT

## 2024-12-16 PROCEDURE — 83036 HEMOGLOBIN GLYCOSYLATED A1C: CPT | Performed by: PHYSICIAN ASSISTANT

## 2024-12-16 PROCEDURE — 99213 OFFICE O/P EST LOW 20 MIN: CPT | Mod: 25 | Performed by: PHYSICIAN ASSISTANT

## 2024-12-16 NOTE — PROGRESS NOTES
Preventive Care Visit  [facility]  Belkis Felton PA-C, Physician Assistant - Medical  Dec 16, 2024      Assessment & Plan     Routine general medical examination at a health care facility  Labs updated today.  Vaccines- flu shot given, declined COVID.  - CBC with platelets  - Comprehensive metabolic panel  - Hemoglobin A1c  - Lipid panel reflex to direct LDL Fasting  - TSH with free T4 reflex    Type 1 diabetes mellitus without complication (H)  Chronic issue, well controlled, last A1C at goal.  Update A1C today.  UTD on eye exam. Diabetic foot exam done today.   Continue to follow with Endocrine.   Wears Dexcom.  - Hemoglobin A1c  - FOOT EXAM      Patient has been advised of split billing requirements and indicates understanding: Yes        Counseling  Appropriate preventive services were addressed with this patient via screening, questionnaire, or discussion as appropriate for fall prevention, nutrition, physical activity, Tobacco-use cessation, social engagement, weight loss and cognition.  Checklist reviewing preventive services available has been given to the patient.  Reviewed patient's diet, addressing concerns and/or questions.   He is at risk for lack of exercise and has been provided with information to increase physical activity for the benefit of his well-being.   The patient reports drinking more than 3 alcoholic drinks per day and/or more than 7 drhnks per week. The patient was counseled and given information about possible harmful effects of excessive alcohol intake.    Risks, benefits and alternatives were discussed with patient. Agreeable to the plan of care.      Power   Tito is a 29 year old, presenting for the following:  Physical        12/16/2024     4:11 PM   Additional Questions   Roomed by MARGE Carr CMA([facility])          HPI      Health Care Directive  Patient does not have a Health Care Directive: Discussed advance care planning with patient;  however, patient declined at this time.      12/11/2024   General Health   How would you rate your overall physical health? Good   Feel stress (tense, anxious, or unable to sleep) Only a little      (!) STRESS CONCERN      12/11/2024   Nutrition   Three or more servings of calcium each day? Yes   Diet: Regular (no restrictions)   How many servings of fruit and vegetables per day? (!) 2-3   How many sweetened beverages each day? 0-1            12/11/2024   Exercise   Days per week of moderate/strenous exercise 3 days   Average minutes spent exercising at this level 30 min            12/11/2024   Social Factors   Frequency of gathering with friends or relatives Twice a week   Worry food won't last until get money to buy more No   Food not last or not have enough money for food? No   Do you have housing? (Housing is defined as stable permanent housing and does not include staying ouside in a car, in a tent, in an abandoned building, in an overnight shelter, or couch-surfing.) Yes   Are you worried about losing your housing? No   Lack of transportation? No   Unable to get utilities (heat,electricity)? No            12/11/2024   Dental   Dentist two times every year? Yes            12/11/2024   TB Screening   Were you born outside of the [place]? No              Today's PHQ-2 Score:       6/28/2024     8:13 AM   PHQ-2 ( 1999 [employer])   Q1: Little interest or pleasure in doing things 0   Q2: Feeling down, depressed or hopeless 0   PHQ-2 Score 0         12/11/2024   Substance Use   Alcohol more than 3/day or more than 7/wk Yes   How often do you have a drink containing alcohol 2 to 3 times a week   How many alcohol drinks on typical day 3 or 4   How often do you have 5+ drinks at one occasion Monthly   Audit 2/3 Score 3   How often not able to stop drinking once started Never   How often failed to do what normally expected Never   How often needed first drink in am after a heavy drinking session Never   How often feeling of  guilt or remorse after drinking Never   How often unable to remember what happened the night before Never   Have you or someone else been injured because of your drinking No   Has anyone been concerned or suggested you cut down on drinking No   TOTAL SCORE - AUDIT 6   Do you use any other substances recreationally? No        Social History     Tobacco Use    Smoking status: Never     Passive exposure: Never    Smokeless tobacco: Never   Vaping Use    Vaping status: Never Used   Substance Use Topics    Alcohol use: Yes     Comment: 4-10/week    Drug use: Never             12/11/2024   One time HIV Screening   Previous HIV test? I don't know          12/11/2024   STI Screening   New sexual partner(s) since last STI/HIV test? No            12/11/2024   Contraception/Family Planning   Questions about contraception or family planning No           Reviewed and updated as needed this visit by Provider                    Patient Active Problem List   Diagnosis    Type 1 diabetes mellitus with hyperglycemia (H)     Past Surgical History:   Procedure Laterality Date    KNEE ARTHROSCOPY W/ ACL RECONSTRUCTION AND PATELLA GRAFT Right     SOFT TISSUE SURGERY  1/31/08    ACL, MCL, Meniscus       Social History     Tobacco Use    Smoking status: Never     Passive exposure: Never    Smokeless tobacco: Never   Substance Use Topics    Alcohol use: Yes     Comment: 4-10/week     Family History   Problem Relation Age of Onset    Cancer - colorectal No family hx of          Current Outpatient Medications   Medication Sig Dispense Refill    acetone urine (KETOSTIX) test strip Use to check urine ketones in the event of glucose over 250 mg/dL and not resolving with correction insulin.  Call endocrinology if ketones measuring small or greater. 50 strip 3    Alcohol Swabs (SM ALCOHOL PREP) 70 % PADS Apply 1 swab topically See Admin Instructions with injection or lancet 10 times per day or as directed. 300 each 11    blood glucose (NO BRAND  SPECIFIED) lancets standard To use to test glucose level in the blood. Use to test blood sugar  4  times daily as directed. To accompany glucose monitor brands per insurance coverage. 400 each 1    blood glucose (NO BRAND SPECIFIED) test strip To use to test glucose level in the blood. Use to test blood sugar 4 times daily as directed. To accompany glucose monitor brands per insurance coverage. 100 strip 11    blood glucose monitoring (NO BRAND SPECIFIED) meter device kit Use as directed. Per insurance coverage 1 kit 0    Continuous Blood Gluc  (DEXCOM G6 ) VIRI Use to read blood sugars as per 's instructions. 1 each 0    Continuous Blood Gluc Transmit (DEXCOM G6 TRANSMITTER) MISC Change every 3 months. 1 each 3    Continuous Glucose Sensor (DEXCOM G7 SENSOR) MISC CHANGE SENSOR EVERY 10 DAYS 6 each 5    Glucagon (GVOKE HYPOPEN) 1 MG/0.2ML pen Inject the contents of 1 device under the skin into lower abdomen, outer thigh, or outer upper arm as needed for hypoglycemia. If no response after 15 minutes, additional 1 mg dose from a new device may be injected while waiting for emergency assistance. 1 mL 1    Injection Device for insulin (INPEN 100-GREY-NOVOLOG-FIASP) VIRI 1 each continuously 2 each 1    insulin aspart (NOVOPEN ECHO) 100 UNIT/ML cartridge Use in In-Pen device.  Administer for all meals and snacks:  1 unit per 8 grams CHO and follow correction scale.  Average daily use is 20 units. 45 mL 3    Insulin Glargine-yfgn (SEMGLEE, YFGN,) 100 UNIT/ML SOPN Inject 17 Units Subcutaneous at bedtime 15 mL 3    multivitamin w/minerals (THERA-VIT-M) tablet Take 1 tablet by mouth daily      NOVOLOG PENFILL 100 UNIT/ML soln Use in In-Pen device. Administer for all meals and snacks: 1 unit per 8 grams CHO and follow correction scale 1 unit per 24 above 140 mg/dl. Average daily use is 20 units. 15 mL 3    omeprazole (PRILOSEC) 20 MG DR capsule Take 1 capsule (20 mg) by mouth daily 30 capsule 1     "PENTIPS 32G X 4 MM miscellaneous USE FIVE DAILY AS DIRECTED 450 each 3    Sharps Container MISC Use as directed to dispose of needles, lancets and other sharps. Per Insurance coverage 1 each 0    Continuous Blood Gluc Sensor (DEXCOM G6 SENSOR) MISC CHANGE EVERY 10 DAYS (Patient not taking: Reported on 12/16/2024) 3 each 11    glucose (BD GLUCOSE) 4 g chewable tablet Take 4 tablets by mouth every 15 minutes as needed for low blood sugar (Patient not taking: Reported on 12/16/2024) 50 tablet 0    glucose 40 % (400 mg/mL) gel 15 g every 15 minutes by mouth as needed for low blood sugar. Oral gel is preferable for conscious and able to swallow patient. (Patient not taking: Reported on 12/16/2024) 112.5 g 0     Allergies   Allergen Reactions    Amoxicillin      1/18/21: Pt does not know what his reaction was (occurred as a child)    Cefprozil      1/18/21: Pt does not know what his reaction was (occurred as a child)           Review of Systems  Constitutional, HEENT, cardiovascular, pulmonary, gi and gu systems are negative, except as otherwise noted.     Objective    Exam  /72   Pulse 63   Temp 97.2  F (36.2  C) (Oral)   Resp 16   Ht 1.803 m (5' 11\")   Wt 79.8 kg (176 lb)   SpO2 100%   BMI 24.55 kg/m     Estimated body mass index is 24.55 kg/m  as calculated from the following:    Height as of this encounter: 1.803 m (5' 11\").    Weight as of this encounter: 79.8 kg (176 lb).    Physical Exam  GENERAL: alert and no distress  EYES: Eyes grossly normal to inspection, PERRL and conjunctivae and sclerae normal  HENT: ear canals and TM's normal, nose and mouth without ulcers or lesions  NECK: no adenopathy, no asymmetry, masses, or scars  RESP: lungs clear to auscultation - no rales, rhonchi or wheezes  CV: regular rate and rhythm, normal S1 S2, no S3 or S4, no murmur, click or rub, no peripheral edema  ABDOMEN: soft, nontender, no hepatosplenomegaly, no masses and bowel sounds normal  MS: no gross " musculoskeletal defects noted, no edema  SKIN: no suspicious lesions or rashes  NEURO: Normal strength and tone, mentation intact and speech normal  PSYCH: mentation appears normal, affect normal/bright        Signed Electronically by: Belkis Felton PA-C

## 2024-12-16 NOTE — PATIENT INSTRUCTIONS
Patient Education   Preventive Care Advice   This is general advice given by our system to help you stay healthy. However, your care team may have specific advice just for you. Please talk to your care team about your preventive care needs.  Nutrition  Eat 5 or more servings of fruits and vegetables each day.  Try wheat bread, brown rice and whole grain pasta (instead of white bread, rice, and pasta).  Get enough calcium and vitamin D. Check the label on foods and aim for 100% of the RDA (recommended daily allowance).  Lifestyle  Exercise at least 150 minutes each week  (30 minutes a day, 5 days a week).  Do muscle strengthening activities 2 days a week. These help control your weight and prevent disease.  No smoking.  Wear sunscreen to prevent skin cancer.  Have a dental exam and cleaning every 6 months.  Yearly exams  See your health care team every year to talk about:  Any changes in your health.  Any medicines your care team has prescribed.  Preventive care, family planning, and ways to prevent chronic diseases.  Shots (vaccines)   HPV shots (up to age 26), if you've never had them before.  Hepatitis B shots (up to age 59), if you've never had them before.  COVID-19 shot: Get this shot when it's due.  Flu shot: Get a flu shot every year.  Tetanus shot: Get a tetanus shot every 10 years.  Pneumococcal, hepatitis A, and RSV shots: Ask your care team if you need these based on your risk.  Shingles shot (for age 50 and up)  General health tests  Diabetes screening:  Starting at age 35, Get screened for diabetes at least every 3 years.  If you are younger than age 35, ask your care team if you should be screened for diabetes.  Cholesterol test: At age 39, start having a cholesterol test every 5 years, or more often if advised.  Bone density scan (DEXA): At age 50, ask your care team if you should have this scan for osteoporosis (brittle bones).  Hepatitis C: Get tested at least once in your life.  STIs (sexually  transmitted infections)  Before age 24: Ask your care team if you should be screened for STIs.  After age 24: Get screened for STIs if you're at risk. You are at risk for STIs (including HIV) if:  You are sexually active with more than one person.  You don't use condoms every time.  You or a partner was diagnosed with a sexually transmitted infection.  If you are at risk for HIV, ask about PrEP medicine to prevent HIV.  Get tested for HIV at least once in your life, whether you are at risk for HIV or not.  Cancer screening tests  Cervical cancer screening: If you have a cervix, begin getting regular cervical cancer screening tests starting at age 21.  Breast cancer scan (mammogram): If you've ever had breasts, begin having regular mammograms starting at age 40. This is a scan to check for breast cancer.  Colon cancer screening: It is important to start screening for colon cancer at age 45.  Have a colonoscopy test every 10 years (or more often if you're at risk) Or, ask your provider about stool tests like a FIT test every year or Cologuard test every 3 years.  To learn more about your testing options, visit:   .  For help making a decision, visit:   https://bit.ly/mi81170.  Prostate cancer screening test: If you have a prostate, ask your care team if a prostate cancer screening test (PSA) at age 55 is right for you.  Lung cancer screening: If you are a current or former smoker ages 50 to 80, ask your care team if ongoing lung cancer screenings are right for you.  For informational purposes only. Not to replace the advice of your health care provider. Copyright   2023 [employer]. All rights reserved. Clinically reviewed by the [facility]. [employer] 855278 - REV 01/24.  9 Ways to Cut Back on Drinking  Maybe you've found yourself drinking more alcohol than you'd prefer. If you want to cut back, here are some ideas to try.    Think before you drink.  Do you really want a drink,  "or is it just a habit? If you're used to having a drink at a certain time, try doing something else then.     Look for substitutes.  Find some no-alcohol drinks that you enjoy, like flavored seltzer water, tea with honey, or tonic with a slice of lime. Or try alcohol-free beer or \"virgin\" cocktails (without the alcohol).     Drink more water.  Use water to quench your thirst. Drink a glass of water before you have any alcohol. Have another glass along with every drink or between drinks.     Shrink your drink.  For example, have a bottle of beer instead of a pint. Use a smaller glass for wine. Choose drinks with lower alcohol content (ABV%). Or use less liquor and more mixer in cocktails.     Slow down.  It's easy to drink quickly and without thinking about it. Pay attention, and make each drink last longer.     Do the math.  Total up how much you spend on alcohol each month. How much is that a year? If you cut back, what could you do with the money you save?     Take a break.  Choose a day or two each week when you won't drink at all. Notice how you feel on those days, physically and emotionally. How did you sleep? Do you feel better? Over time, add more break days.     Count calories.  Would you like to lose some weight? For some people that's a good motivator for cutting back. Figure out how many calories are in each drink. How many does that add up to in a day? In a week? In a month?     Practice saying no.  Be ready when someone offers you a drink. Try: \"Thanks, I've had enough.\" Or \"Thanks, but I'm cutting back.\" Or \"No, thanks. I feel better when I drink less.\"   Current as of: November 15, 2023  Content Version: 14.2 2024 [employer].   Care instructions adapted under license by your healthcare professional. If you have questions about a medical condition or this instruction, always ask your healthcare professional. [employer] disclaims any warranty or liability for your use of " this information.

## 2024-12-17 DIAGNOSIS — E10.65 TYPE 1 DIABETES MELLITUS WITH HYPERGLYCEMIA (H): Primary | ICD-10-CM

## 2024-12-17 LAB
ALBUMIN SERPL BCG-MCNC: 3.8 G/DL (ref 3.5–5.2)
ALP SERPL-CCNC: 51 U/L (ref 40–150)
ALT SERPL W P-5'-P-CCNC: 26 U/L (ref 0–70)
ANION GAP SERPL CALCULATED.3IONS-SCNC: 8 MMOL/L (ref 7–15)
AST SERPL W P-5'-P-CCNC: 33 U/L (ref 0–45)
BILIRUB SERPL-MCNC: 0.4 MG/DL
BUN SERPL-MCNC: 15.7 MG/DL (ref 6–20)
CALCIUM SERPL-MCNC: 8.7 MG/DL (ref 8.8–10.4)
CHLORIDE SERPL-SCNC: 103 MMOL/L (ref 98–107)
CHOLEST SERPL-MCNC: 135 MG/DL
CREAT SERPL-MCNC: 0.88 MG/DL (ref 0.67–1.17)
EGFRCR SERPLBLD CKD-EPI 2021: >90 ML/MIN/1.73M2
FASTING STATUS PATIENT QL REPORTED: NO
FASTING STATUS PATIENT QL REPORTED: NO
GLUCOSE SERPL-MCNC: 108 MG/DL (ref 70–99)
HCO3 SERPL-SCNC: 28 MMOL/L (ref 22–29)
HDLC SERPL-MCNC: 51 MG/DL
LDLC SERPL CALC-MCNC: 75 MG/DL
NONHDLC SERPL-MCNC: 84 MG/DL
POTASSIUM SERPL-SCNC: 3.9 MMOL/L (ref 3.4–5.3)
PROT SERPL-MCNC: 5.5 G/DL (ref 6.4–8.3)
SODIUM SERPL-SCNC: 139 MMOL/L (ref 135–145)
TRIGL SERPL-MCNC: 45 MG/DL
TSH SERPL DL<=0.005 MIU/L-ACNC: 1.86 UIU/ML (ref 0.3–4.2)

## 2024-12-17 RX ORDER — INSULIN ASPART 100 [IU]/ML
INJECTION, SOLUTION INTRAVENOUS; SUBCUTANEOUS
Qty: 15 ML | Refills: 0 | Status: SHIPPED | OUTPATIENT
Start: 2024-12-17

## 2025-03-10 DIAGNOSIS — E10.10 DIABETIC KETOACIDOSIS WITHOUT COMA ASSOCIATED WITH TYPE 1 DIABETES MELLITUS (H): ICD-10-CM

## 2025-03-12 RX ORDER — PEN NEEDLE, DIABETIC 32GX 5/32"
NEEDLE, DISPOSABLE MISCELLANEOUS
Qty: 500 EACH | Refills: 3 | Status: SHIPPED | OUTPATIENT
Start: 2025-03-12

## 2025-03-29 ENCOUNTER — HEALTH MAINTENANCE LETTER (OUTPATIENT)
Age: 30
End: 2025-03-29

## 2025-07-17 ENCOUNTER — MYC REFILL (OUTPATIENT)
Dept: ENDOCRINOLOGY | Facility: CLINIC | Age: 30
End: 2025-07-17
Payer: COMMERCIAL

## 2025-07-17 DIAGNOSIS — E10.9 TYPE 1 DIABETES MELLITUS (H): Primary | ICD-10-CM

## 2025-07-17 RX ORDER — INSULIN PEN,REUSABLE,BT LISPRO
1 INSULIN PEN (EA) SUBCUTANEOUS CONTINUOUS
Qty: 2 EACH | Refills: 3 | Status: SHIPPED | OUTPATIENT
Start: 2025-07-17

## 2025-07-22 ENCOUNTER — MYC MEDICAL ADVICE (OUTPATIENT)
Dept: ENDOCRINOLOGY | Facility: CLINIC | Age: 30
End: 2025-07-22
Payer: COMMERCIAL

## 2025-07-22 DIAGNOSIS — E10.9 TYPE 1 DIABETES MELLITUS (H): ICD-10-CM

## 2025-07-22 DIAGNOSIS — E10.10 DIABETIC KETOACIDOSIS WITHOUT COMA ASSOCIATED WITH TYPE 1 DIABETES MELLITUS (H): ICD-10-CM

## 2025-07-22 DIAGNOSIS — E10.65 TYPE 1 DIABETES MELLITUS WITH HYPERGLYCEMIA (H): Primary | ICD-10-CM

## 2025-07-22 DIAGNOSIS — E10.9 TYPE 1 DIABETES MELLITUS WITHOUT COMPLICATION (H): ICD-10-CM

## 2025-07-23 RX ORDER — INSULIN PEN,REUSABLE,BT LISPRO
1 INSULIN PEN (EA) SUBCUTANEOUS CONTINUOUS
Qty: 1 EACH | Refills: 1 | Status: SHIPPED | OUTPATIENT
Start: 2025-07-23

## 2025-07-23 NOTE — TELEPHONE ENCOUNTER
Placed [place] order to [facility].    Paula Foley RD, LD [facility]  Diabetes Educator